# Patient Record
Sex: FEMALE | Race: WHITE | NOT HISPANIC OR LATINO | Employment: FULL TIME
[De-identification: names, ages, dates, MRNs, and addresses within clinical notes are randomized per-mention and may not be internally consistent; named-entity substitution may affect disease eponyms.]

---

## 2017-05-27 ENCOUNTER — HEALTH MAINTENANCE LETTER (OUTPATIENT)
Age: 52
End: 2017-05-27

## 2018-05-02 ENCOUNTER — TRANSFERRED RECORDS (OUTPATIENT)
Dept: MULTI SPECIALTY CLINIC | Facility: CLINIC | Age: 53
End: 2018-05-02

## 2018-05-02 LAB — HEP C HIM: NORMAL

## 2021-05-16 ENCOUNTER — OFFICE VISIT (OUTPATIENT)
Dept: URGENT CARE | Facility: URGENT CARE | Age: 56
End: 2021-05-16
Payer: COMMERCIAL

## 2021-05-16 VITALS
SYSTOLIC BLOOD PRESSURE: 110 MMHG | HEART RATE: 78 BPM | OXYGEN SATURATION: 97 % | TEMPERATURE: 98 F | RESPIRATION RATE: 16 BRPM | DIASTOLIC BLOOD PRESSURE: 70 MMHG

## 2021-05-16 DIAGNOSIS — J22 LOWER RESPIRATORY INFECTION: Primary | ICD-10-CM

## 2021-05-16 PROCEDURE — 99203 OFFICE O/P NEW LOW 30 MIN: CPT | Performed by: EMERGENCY MEDICINE

## 2021-05-16 RX ORDER — DOXYCYCLINE HYCLATE 100 MG
100 TABLET ORAL 2 TIMES DAILY
Qty: 20 TABLET | Refills: 0 | Status: SHIPPED | OUTPATIENT
Start: 2021-05-16 | End: 2021-05-26

## 2021-05-16 RX ORDER — PAROXETINE 10 MG/1
10 TABLET, FILM COATED ORAL DAILY
COMMUNITY
Start: 2021-02-22 | End: 2023-07-31

## 2021-05-16 RX ORDER — ALBUTEROL SULFATE 90 UG/1
2 AEROSOL, METERED RESPIRATORY (INHALATION) EVERY 4 HOURS PRN
Qty: 8 G | Refills: 0 | Status: SHIPPED | OUTPATIENT
Start: 2021-05-16

## 2021-05-16 RX ORDER — ATORVASTATIN CALCIUM 40 MG/1
40 TABLET, FILM COATED ORAL
COMMUNITY
Start: 2020-08-13 | End: 2024-02-06

## 2021-05-16 NOTE — PROGRESS NOTES
Assessment: Approximate 1 week history of respiratory infection now with increasing discolored phlegm and subjective wheezing.    Plan: Doxycycline, albuterol, with close monitoring and follow-up.    HPI: Patient is a 55-year-old female who is been ill for about 5 to 6 days with cough, productive of discolored phlegm now turning from yellow to green.  She is subjectively wheezing.  She is a smoker.  No history of asthma.  Denies Covid exposure.      ROS: See HPI otherwise normal.    Allergies   Allergen Reactions     Codeine Other (See Comments)     No Clinical Screening - See Comments Other (See Comments)     Animal fur      Current Outpatient Medications   Medication Sig Dispense Refill     albuterol (PROAIR HFA/PROVENTIL HFA/VENTOLIN HFA) 108 (90 Base) MCG/ACT inhaler Inhale 2 puffs into the lungs every 4 hours as needed for shortness of breath / dyspnea or wheezing 8 g 0     atorvastatin (LIPITOR) 40 MG tablet Take 40 mg by mouth       doxycycline hyclate (VIBRA-TABS) 100 MG tablet Take 1 tablet (100 mg) by mouth 2 times daily for 10 days 20 tablet 0     PARoxetine (PAXIL) 10 MG tablet Take 10 mg by mouth daily           PE: No acute distress.  Vital signs are normal with a pulse ox of 97%.  HEENT reveals moist oral mucous membranes.  Posterior pharynx slightly injected.  Ears reveal normal TMs.  Neck is supple without adenopathy.  Lungs reveal scattered expiratory wheezes with good air excursion.  No rales heard.  Heart is regular.  Skin warm dry.        TREATMENT: None.      ASSESSMENT: Lower respiratory tract with reactive airway component.      DIAGNOSIS: Lower respiratory tract infection.      PLAN: Doxycycline, albuterol as instructed no smoking.  Recheck if worse.  Recheck 1 week if still ill.

## 2021-05-16 NOTE — PATIENT INSTRUCTIONS
Antibiotics as instructed.    Use inhaler every 4 hours as needed with a minimum of 4 times daily for 1 week.    No smoking.    Recheck if more ill.    Recheck 1 week if no improvement.  Patient Education     Preventing Common Respiratory Infections  Respiratory infections such as colds and the flu (influenza) are common in winter. These infections are often caused by viruses. They may share some symptoms. But not all respiratory infections are the same. Some make you more sick than others. You can take steps to prevent common respiratory infections. And if you get sick, you can take care of yourself to keep the infection from getting worse.    What is a cold?  Symptoms include runny nose, coughing and sneezing, and sore throat. Cold symptoms tend to be milder than flu symptoms.  Symptoms tend to come on slowly. They last for a few days to about a week.  With a cold, you can still do most of the things you normally do.    What is the flu?  Symptoms include fever, headache, extreme tiredness (fatigue), cough, sore throat, runny nose, and muscle aches. Children may have upset stomach and vomiting, but adults often don t.  Symptoms tend to come on quickly. Some, such as fatigue and cough, can last a few weeks.  With the flu, you may feel worn out and not able to do normal activities.  It s most likely not the flu if an adult has vomiting or diarrhea for a day or two. This so-called  stomach flu  is probably a GI (gastrointestinal) infection.    When the infection gets worse  Without proper care, a respiratory infection can get worse. It can lead to serious complications and death. If you aren t getting better, call your healthcare provider. Complications can include:  Bronchitis (infection of the airways that leads to shortness of breath and coughing up thick yellow or green mucus)  Pneumonia (infection of the lungs in which fluid and mucus settle in the lungs, making breathing difficult)  Worsening of chronic  conditions such as heart failure, chronic lung disease, asthma, or diabetes  Severe dehydration (loss of fluids)  Sinus problems  Ear infections  Get a flu vaccine  A flu vaccine protects you from influenza (but not other colds or infections). Get a vaccine each fall, before flu season starts. This can be done at a clinic, healthcare provider s office, pharmacy, [facility] center, or through your workplace.  Get pneumococcal vaccines  Pneumonia can be a complication of influenza. There are 2 pneumococcal pneumonia vaccines that protect against many types of pneumonia. Talk with your healthcare provider about these important vaccines.   Keep germs from spreading  No one likes getting sick. To protect yourself and others from cold and flu germs:  Wash your hands often with warm water and soap. Scrub them for 15 to 20 seconds. Use alcohol-based hand  when you don t have access to soap and water.  Don t touch your eyes, nose, and mouth. This may help you keep germs out of your body.  Try to stay away from people with respiratory infections. You may want to stay out of crowds during flu season (winter).  Ask your healthcare provider if you should get a pneumonia vaccination.  Don't smoke and don't let others smoke in your home or car  How to wash your hands  Use warm water and plenty of soap. Work up a good lather.  Clean your whole hand, under your nails, between your fingers, and up your wrists. Wash for at least 15 to 20 seconds. Don t just wipe--rub well.  Rinse. Let the water run down your fingertips, not up your wrists.  In a public restroom, use a paper towel to turn off the faucet and open the door.    [employer] last reviewed this educational content on 11/1/2019 2000-2021 [employer]. All rights reserved. This information is not intended as a substitute for professional medical care. Always follow your healthcare professional's instructions.

## 2021-09-24 ENCOUNTER — TRANSFERRED RECORDS (OUTPATIENT)
Dept: MULTI SPECIALTY CLINIC | Facility: CLINIC | Age: 56
End: 2021-09-24

## 2021-09-24 LAB
CHOLESTEROL (EXTERNAL): 132 MG/DL
HDLC SERPL-MCNC: 53 MG/DL
LDL CHOLESTEROL CALCULATED (EXTERNAL): 66 MG/DL (ref 0–159)
TRIGLYCERIDES (EXTERNAL): 67 MG/DL (ref 30–150)

## 2021-12-08 ENCOUNTER — TRANSFERRED RECORDS (OUTPATIENT)
Dept: MULTI SPECIALTY CLINIC | Facility: CLINIC | Age: 56
End: 2021-12-08

## 2023-07-28 ENCOUNTER — OFFICE VISIT (OUTPATIENT)
Dept: FAMILY MEDICINE | Facility: CLINIC | Age: 58
End: 2023-07-28
Payer: COMMERCIAL

## 2023-07-28 ENCOUNTER — LAB (OUTPATIENT)
Dept: LAB | Facility: CLINIC | Age: 58
End: 2023-07-28
Payer: COMMERCIAL

## 2023-07-28 VITALS
OXYGEN SATURATION: 97 % | DIASTOLIC BLOOD PRESSURE: 64 MMHG | RESPIRATION RATE: 16 BRPM | WEIGHT: 147 LBS | HEIGHT: 68 IN | HEART RATE: 74 BPM | BODY MASS INDEX: 22.28 KG/M2 | SYSTOLIC BLOOD PRESSURE: 114 MMHG

## 2023-07-28 DIAGNOSIS — Z00.00 ROUTINE GENERAL MEDICAL EXAMINATION AT A HEALTH CARE FACILITY: ICD-10-CM

## 2023-07-28 DIAGNOSIS — R25.1 TREMOR: ICD-10-CM

## 2023-07-28 DIAGNOSIS — F41.0 PANIC ATTACK: ICD-10-CM

## 2023-07-28 DIAGNOSIS — E78.2 MIXED HYPERLIPIDEMIA: ICD-10-CM

## 2023-07-28 DIAGNOSIS — I31.39 EFFUSION, PERICARDIUM: ICD-10-CM

## 2023-07-28 DIAGNOSIS — Z86.39 HX OF THYROID NODULE: ICD-10-CM

## 2023-07-28 DIAGNOSIS — Z00.00 ROUTINE GENERAL MEDICAL EXAMINATION AT A HEALTH CARE FACILITY: Primary | ICD-10-CM

## 2023-07-28 DIAGNOSIS — Z12.31 VISIT FOR SCREENING MAMMOGRAM: ICD-10-CM

## 2023-07-28 DIAGNOSIS — F32.A DEPRESSION, UNSPECIFIED DEPRESSION TYPE: ICD-10-CM

## 2023-07-28 DIAGNOSIS — F17.200 TOBACCO USE DISORDER: ICD-10-CM

## 2023-07-28 LAB
ALBUMIN SERPL BCG-MCNC: 4.4 G/DL (ref 3.5–5.2)
ALP SERPL-CCNC: 78 U/L (ref 35–104)
ALT SERPL W P-5'-P-CCNC: 21 U/L (ref 0–50)
ANION GAP SERPL CALCULATED.3IONS-SCNC: 9 MMOL/L (ref 7–15)
AST SERPL W P-5'-P-CCNC: 26 U/L (ref 0–45)
BASOPHILS # BLD AUTO: 0.1 10E3/UL (ref 0–0.2)
BASOPHILS NFR BLD AUTO: 1 %
BILIRUB SERPL-MCNC: 0.8 MG/DL
BUN SERPL-MCNC: 16.3 MG/DL (ref 6–20)
CALCIUM SERPL-MCNC: 9.9 MG/DL (ref 8.6–10)
CHLORIDE SERPL-SCNC: 105 MMOL/L (ref 98–107)
CHOLEST SERPL-MCNC: 171 MG/DL
CREAT SERPL-MCNC: 0.66 MG/DL (ref 0.51–0.95)
CRP SERPL-MCNC: <3 MG/L
DEPRECATED HCO3 PLAS-SCNC: 27 MMOL/L (ref 22–29)
EOSINOPHIL # BLD AUTO: 0.4 10E3/UL (ref 0–0.7)
EOSINOPHIL NFR BLD AUTO: 4 %
ERYTHROCYTE [DISTWIDTH] IN BLOOD BY AUTOMATED COUNT: 12.3 % (ref 10–15)
ERYTHROCYTE [SEDIMENTATION RATE] IN BLOOD BY WESTERGREN METHOD: 8 MM/HR (ref 0–30)
GFR SERPL CREATININE-BSD FRML MDRD: >90 ML/MIN/1.73M2
GLUCOSE SERPL-MCNC: 94 MG/DL (ref 70–99)
HCT VFR BLD AUTO: 40.9 % (ref 35–47)
HDLC SERPL-MCNC: 56 MG/DL
HGB BLD-MCNC: 13.8 G/DL (ref 11.7–15.7)
IMM GRANULOCYTES # BLD: 0 10E3/UL
IMM GRANULOCYTES NFR BLD: 0 %
LDLC SERPL CALC-MCNC: 95 MG/DL
LYMPHOCYTES # BLD AUTO: 3 10E3/UL (ref 0.8–5.3)
LYMPHOCYTES NFR BLD AUTO: 36 %
MCH RBC QN AUTO: 30.7 PG (ref 26.5–33)
MCHC RBC AUTO-ENTMCNC: 33.7 G/DL (ref 31.5–36.5)
MCV RBC AUTO: 91 FL (ref 78–100)
MONOCYTES # BLD AUTO: 0.7 10E3/UL (ref 0–1.3)
MONOCYTES NFR BLD AUTO: 8 %
NEUTROPHILS # BLD AUTO: 4.2 10E3/UL (ref 1.6–8.3)
NEUTROPHILS NFR BLD AUTO: 51 %
NONHDLC SERPL-MCNC: 115 MG/DL
PLATELET # BLD AUTO: 308 10E3/UL (ref 150–450)
POTASSIUM SERPL-SCNC: 4.5 MMOL/L (ref 3.4–5.3)
PROT SERPL-MCNC: 7 G/DL (ref 6.4–8.3)
RBC # BLD AUTO: 4.5 10E6/UL (ref 3.8–5.2)
SODIUM SERPL-SCNC: 141 MMOL/L (ref 136–145)
T4 FREE SERPL-MCNC: 0.87 NG/DL (ref 0.9–1.7)
TRIGL SERPL-MCNC: 102 MG/DL
TSH SERPL DL<=0.005 MIU/L-ACNC: 0.17 UIU/ML (ref 0.3–4.2)
WBC # BLD AUTO: 8.3 10E3/UL (ref 4–11)

## 2023-07-28 PROCEDURE — 80053 COMPREHEN METABOLIC PANEL: CPT

## 2023-07-28 PROCEDURE — 84443 ASSAY THYROID STIM HORMONE: CPT

## 2023-07-28 PROCEDURE — 80061 LIPID PANEL: CPT

## 2023-07-28 PROCEDURE — 36415 COLL VENOUS BLD VENIPUNCTURE: CPT

## 2023-07-28 PROCEDURE — 85025 COMPLETE CBC W/AUTO DIFF WBC: CPT

## 2023-07-28 PROCEDURE — 84439 ASSAY OF FREE THYROXINE: CPT

## 2023-07-28 PROCEDURE — 99214 OFFICE O/P EST MOD 30 MIN: CPT | Mod: 25 | Performed by: FAMILY MEDICINE

## 2023-07-28 PROCEDURE — 99396 PREV VISIT EST AGE 40-64: CPT | Performed by: FAMILY MEDICINE

## 2023-07-28 PROCEDURE — 85652 RBC SED RATE AUTOMATED: CPT

## 2023-07-28 PROCEDURE — 86140 C-REACTIVE PROTEIN: CPT

## 2023-07-28 RX ORDER — ALPRAZOLAM 0.25 MG
TABLET ORAL
COMMUNITY
Start: 2022-07-18 | End: 2023-07-31

## 2023-07-28 RX ORDER — IBUPROFEN 200 MG
TABLET ORAL
COMMUNITY

## 2023-07-28 RX ORDER — CALCIUM CARBONATE 500(1250)
TABLET,CHEWABLE ORAL
COMMUNITY

## 2023-07-28 RX ORDER — CETIRIZINE HYDROCHLORIDE 10 MG/1
TABLET ORAL DAILY PRN
COMMUNITY

## 2023-07-28 RX ORDER — TRETINOIN 0.25 MG/G
CREAM TOPICAL
COMMUNITY
Start: 2023-07-22

## 2023-07-28 RX ORDER — ATORVASTATIN CALCIUM 20 MG/1
TABLET, FILM COATED ORAL
COMMUNITY
Start: 2023-03-31 | End: 2024-05-02

## 2023-07-28 ASSESSMENT — ENCOUNTER SYMPTOMS
ABDOMINAL PAIN: 0
PALPITATIONS: 0
SORE THROAT: 0
HEADACHES: 1
COUGH: 0
HEMATOCHEZIA: 0
PARESTHESIAS: 0
WEAKNESS: 0
NERVOUS/ANXIOUS: 1
EYE PAIN: 0
FREQUENCY: 0
SHORTNESS OF BREATH: 0
DYSURIA: 0
HEMATURIA: 0
ARTHRALGIAS: 0
CHILLS: 0
MYALGIAS: 0
JOINT SWELLING: 0
CONSTIPATION: 0
NAUSEA: 0
HEARTBURN: 1
BREAST MASS: 0
FEVER: 0
DIZZINESS: 1
DIARRHEA: 0

## 2023-07-28 ASSESSMENT — PAIN SCALES - GENERAL: PAINLEVEL: NO PAIN (0)

## 2023-07-28 NOTE — PATIENT INSTRUCTIONS
Patient is to contact [facility]  at [phone], to schedule this appointment.    Preventive Health Recommendations  Female Ages 50 - 64    Yearly exam: See your health care provider every year in order to  Review health changes.   Discuss preventive care.    Review your medicines if your doctor has prescribed any.    Get a Pap test every three years (unless you have an abnormal result and your provider advises testing more often).  If you get Pap tests with HPV test, you only need to test every 5 years, unless you have an abnormal result.   You do not need a Pap test if your uterus was removed (hysterectomy) and you have not had cancer.  You should be tested each year for STDs (sexually transmitted diseases) if you're at risk.   Have a mammogram every 1 to 2 years.  Have a colonoscopy at age 50, or have a yearly FIT test (stool test). These exams screen for colon cancer.    Have a cholesterol test every 5 years, or more often if advised.  Have a diabetes test (fasting glucose) every three years. If you are at risk for diabetes, you should have this test more often.   If you are at risk for osteoporosis (brittle bone disease), think about having a bone density scan (DEXA).    Shots: Get a flu shot each year. Get a tetanus shot every 10 years.    Nutrition:   Eat at least 5 servings of fruits and vegetables each day.  Eat whole-grain bread, whole-wheat pasta and brown rice instead of white grains and rice.  Get adequate Calcium and Vitamin D.     Lifestyle  Exercise at least 150 minutes a week (30 minutes a day, 5 days a week). This will help you control your weight and prevent disease.  Limit alcohol to one drink per day.  No smoking.   Wear sunscreen to prevent skin cancer.   See your dentist every six months for an exam and cleaning.  See your eye doctor every 1 to 2 years.

## 2023-07-28 NOTE — PROGRESS NOTES
SUBJECTIVE:   CC: Olena is an 57 year old who presents for preventive health visit.       7/28/2023     1:19 PM   Additional Questions   Roomed by Lraa BUITRAGO   Accompanied by Self       Healthy Habits:     Getting at least 3 servings of Calcium per day:  Yes    Bi-annual eye exam:  Yes    Dental care twice a year:  NO    Sleep apnea or symptoms of sleep apnea:  None    Diet:  Regular (no restrictions)    Frequency of exercise:  2-3 days/week    Duration of exercise:  15-30 minutes    Taking medications regularly:  Yes    Medication side effects:  None    Additional concerns today:  Yes    -Reports a few years ago with seen by cardiologist that she told she had fluid around her heart.  Underwent testing at that time and was asymptomatic.  Will occasionally become fatigued with exercise, no chest pain palpitations or presyncopal symptoms.  Wondering if this could be from fluid again    -History of Parkinson's disease in mom.  Will notice tremors, more so left than right.  Noticed head shaking intermittently.  Mainly noted when she is trying to write or  cups.  No other neurological symptoms noted    Today's PHQ-2 Score:       7/28/2023     1:11 PM   PHQ-2 ( 1999 [employer])   Q1: Little interest or pleasure in doing things 0   Q2: Feeling down, depressed or hopeless 0   PHQ-2 Score 0   Q1: Little interest or pleasure in doing things Not at all   Q2: Feeling down, depressed or hopeless Not at all   PHQ-2 Score 0     Have you ever done Advance Care Planning? (For example, a Health Directive, POLST, or a discussion with a medical provider or your loved ones about your wishes): No, advance care planning information given to patient to review.  Patient plans to discuss their wishes with loved ones or provider.      Social History     Tobacco Use    Smoking status: Every Day    Smokeless tobacco: Never   Substance Use Topics    Alcohol use: Not on file             7/28/2023     1:11 PM   Alcohol Use   Prescreen: >3  drinks/day or >7 drinks/week? No     Reviewed orders with patient.  Reviewed health maintenance and updated orders accordingly - Yes  Lab work is in process    Breast Cancer Screening:    FHS-7:       7/28/2023     1:12 PM   Breast CA Risk Assessment (FHS-7)   Did any of your first-degree relatives have breast or ovarian cancer? Yes   Did any of your relatives have bilateral breast cancer? Yes   Did any man in your family have breast cancer? No   Did any woman in your family have breast and ovarian cancer? No   Did any woman in your family have breast cancer before age 50 y? Yes   Do you have 2 or more relatives with breast and/or ovarian cancer? Unknown   Do you have 2 or more relatives with breast and/or bowel cancer? Unknown     click delete button to remove this line now  Mammogram Screening: Recommended mammography every 1-2 years with patient discussion and risk factor consideration  Pertinent mammograms are reviewed under the imaging tab.    History of abnormal Pap smear: Status post benign hysterectomy. Health Maintenance and Surgical History updated.     Reviewed and updated as needed this visit by clinical staff   Tobacco  Allergies  Meds              Reviewed and updated as needed this visit by Provider                   Past Medical History:   Diagnosis Date    Anxiety     History of colonic polyps     Mixed hyperlipidemia     Thyroid nodule       Past Surgical History:   Procedure Laterality Date    HYSTERECTOMY TOTAL ABDOMINAL  01/01/2007       Review of Systems   Constitutional:  Negative for chills and fever.   HENT:  Negative for congestion, ear pain, hearing loss and sore throat.    Eyes:  Negative for pain and visual disturbance.   Respiratory:  Negative for cough and shortness of breath.    Cardiovascular:  Negative for chest pain, palpitations and peripheral edema.   Gastrointestinal:  Positive for heartburn. Negative for abdominal pain, constipation, diarrhea, hematochezia and nausea.  "  Breasts:  Negative for tenderness, breast mass and discharge.   Genitourinary:  Positive for vaginal discharge. Negative for dysuria, frequency, genital sores, hematuria, pelvic pain, urgency and vaginal bleeding.   Musculoskeletal:  Negative for arthralgias, joint swelling and myalgias.   Skin:  Negative for rash.   Neurological:  Positive for dizziness and headaches. Negative for weakness and paresthesias.   Psychiatric/Behavioral:  Negative for mood changes. The patient is nervous/anxious.         OBJECTIVE:   /64 (BP Location: Right arm, Patient Position: Sitting, Cuff Size: Adult Regular)   Pulse 74   Resp 16   Ht 1.715 m (5' 7.5\")   Wt 66.7 kg (147 lb)   LMP 01/01/2009   SpO2 97%   BMI 22.68 kg/m    Physical Exam  Constitutional:       Appearance: Normal appearance.   HENT:      Head: Normocephalic.      Right Ear: Tympanic membrane normal.      Left Ear: Tympanic membrane normal.   Eyes:      Conjunctiva/sclera: Conjunctivae normal.   Cardiovascular:      Rate and Rhythm: Normal rate and regular rhythm.      Pulses: Normal pulses.      Heart sounds: Normal heart sounds.   Pulmonary:      Effort: Pulmonary effort is normal.      Breath sounds: Normal breath sounds.   Abdominal:      General: Bowel sounds are normal.   Skin:     General: Skin is warm and dry.   Neurological:      Mental Status: She is alert.   Psychiatric:         Thought Content: Thought content normal.         Judgment: Judgment normal.       ASSESSMENT/PLAN:   Olena was seen today for establish care, physical and shaking.    Diagnoses and all orders for this visit:    Routine general medical examination at a health care facility  -     CBC with platelets and differential; Future  -     Comprehensive metabolic panel (BMP + Alb, Alk Phos, ALT, AST, Total. Bili, TP); Future    Visit for screening mammogram  -     MA SCREENING DIGITAL BILAT - Future  (s+30); Future    Tobacco use disorder  Encourage cessation    Tremor  Seems " consistent with intention tremor, however with family history of Parkinson's can consider meeting with neurology  -     TSH with free T4 reflex; Future  -     CBC with platelets and differential; Future  -     Adult Neurology  Referral; Future    Mixed hyperlipidemia  -     Lipid panel reflex to direct LDL Non-fasting; Future  -     Lipid panel reflex to direct LDL Non-fasting; Future    Hx of thyroid nodule  Underwent FNA in the past.  This was normal however due to side recommended repeating ultrasound in 3 years  -     TSH with free T4 reflex; Future  -     US Thyroid; Future    Effusion, pericardium  Can consider repeating echocardiogram.  I Do not think her symptoms are anginal.  Discussed symptoms to monitor for that would provide her with more prompt evaluation  -     ESR: Erythrocyte sedimentation rate; Future  -     CRP, inflammation; Future  -     Echocardiogram Complete; Future    Other orders  -     REVIEW OF HEALTH MAINTENANCE PROTOCOL ORDERS    Depression, unspecified depression type  Stable,r efill provided  - PARoxetine (PAXIL) 10 MG tablet; Take 1 tablet (10 mg) by mouth daily  Dispense: 90 tablet; Refill: 3     Panic attack  Encouraged sparingly using  - ALPRAZolam (XANAX) 0.25 MG tablet; TAKE ONE TABLET BY MOUTH ONCE DAILY AS NEEDED FOR ANXIETY (PANIC ATTACKS)  Dispense: 30 tablet; Refill: 0      Patient has been advised of split billing requirements and indicates understanding: Yes      COUNSELING:  Reviewed preventive health counseling, as reflected in patient instructions  Special attention given to:        Regular exercise       Healthy diet/nutrition       Vision screening       Folic Acid       Osteoporosis prevention/bone health        She reports that she has been smoking. She has never used smokeless tobacco.  Nicotine/Tobacco Cessation Plan:   Information offered: Patient not interested at this time      AMBER PICKERING DO  [facility]

## 2023-07-29 ENCOUNTER — TELEPHONE (OUTPATIENT)
Dept: FAMILY MEDICINE | Facility: CLINIC | Age: 58
End: 2023-07-29
Payer: COMMERCIAL

## 2023-07-29 NOTE — TELEPHONE ENCOUNTER
Reason for Call:  Medication or medication refill:    Do you use a [facility]?  Name of the pharmacy and phone number for the current request:  [facility] in [place] on [address] [phone]    Name of the medication requested:  All patients meds prescribed    Other request: Patient was seen 07/28/2023 and her medications werent sent to pharmacy in [place]    Can we leave a detailed message on this number? YES    Phone number patient can be reached at: Cell number on file:    Telephone Information:   Mobile [phone]       Best Time: anytime    Call taken on 7/29/2023 at 10:10 AM by MARÍA REYES

## 2023-07-31 RX ORDER — PAROXETINE 10 MG/1
10 TABLET, FILM COATED ORAL DAILY
Qty: 30 TABLET | Status: CANCELLED | OUTPATIENT
Start: 2023-07-31

## 2023-07-31 RX ORDER — PAROXETINE 10 MG/1
10 TABLET, FILM COATED ORAL DAILY
Qty: 90 TABLET | Refills: 3 | Status: SHIPPED | OUTPATIENT
Start: 2023-07-31 | End: 2024-02-06

## 2023-07-31 RX ORDER — ALPRAZOLAM 0.25 MG
0.25 TABLET ORAL PRN
Status: CANCELLED | OUTPATIENT
Start: 2023-07-31

## 2023-07-31 RX ORDER — ATORVASTATIN CALCIUM 20 MG/1
20 TABLET, FILM COATED ORAL DAILY
Qty: 90 TABLET | Refills: 3 | Status: SHIPPED | OUTPATIENT
Start: 2023-07-31 | End: 2024-02-06

## 2023-07-31 RX ORDER — ATORVASTATIN CALCIUM 20 MG/1
20 TABLET, FILM COATED ORAL DAILY
Qty: 30 TABLET | Status: CANCELLED | OUTPATIENT
Start: 2023-07-31

## 2023-07-31 RX ORDER — ALPRAZOLAM 0.25 MG
TABLET ORAL
Qty: 30 TABLET | Refills: 0 | Status: SHIPPED | OUTPATIENT
Start: 2023-07-31 | End: 2024-03-13

## 2023-07-31 NOTE — TELEPHONE ENCOUNTER
Called patient to obtain current medications that she needs Dr. Rapp to review to order.     Cued up medications but unsure how many refills should be ordered. Patient had yearly visit 7/28/23 but no medications were ordered. All medications were reported as historic.    Patient reports she is prescribed:   Xanax 0.25 mg take 1 tablet as needed for anxiety or panic attacks. Dispense 20 tablets.    Requested Prescriptions   Pending Prescriptions Disp Refills    PARoxetine (PAXIL) 10 MG tablet 30 tablet      Sig: Take 1 tablet (10 mg) by mouth daily       There is no refill protocol information for this order       atorvastatin (LIPITOR) 20 MG tablet 30 tablet      Sig: Take 1 tablet (20 mg) by mouth daily       There is no refill protocol information for this order       ALPRAZolam (XANAX) 0.25 MG tablet       Sig: Take 1 tablet (0.25 mg) by mouth as needed for anxiety       There is no refill protocol information for this order        Routing refill request to provider for review/approval because:  Medication is reported/historical    Katia Lerner RN on 7/31/2023 at 10:46 AM

## 2023-07-31 NOTE — TELEPHONE ENCOUNTER
Called and left message notifying patient the meds were sent for refill.     Katia Lerner RN on 7/31/2023 at 3:35 PM

## 2023-07-31 NOTE — TELEPHONE ENCOUNTER
I have sent refills over.  I apologize for the delay, please let patient know.    AMBER PICKERING, DO

## 2023-08-09 DIAGNOSIS — Z86.39 HX OF THYROID NODULE: Primary | ICD-10-CM

## 2023-08-10 ENCOUNTER — TELEPHONE (OUTPATIENT)
Dept: FAMILY MEDICINE | Facility: CLINIC | Age: 58
End: 2023-08-10
Payer: COMMERCIAL

## 2023-08-10 NOTE — TELEPHONE ENCOUNTER
Dr. Rapp:    Patient received her results today and has additional question for you regarding her echo. Patient states she still very mild ache in her chest, she is not short of breath, not radiating anywhere, no other symptoms. She scheduled the Echo but it is in October, is this ok to wait till then? Patient states you are aware of the pain and she verbalizes understanding of when to seek ER care.    Please advise.      NICOLE Baird

## 2023-08-10 NOTE — TELEPHONE ENCOUNTER
Ok to wait until then as long as symptoms are stable. IF she is willing to drive she can also look at other locations that may have sooner openings.   AMBER PICKERING, DO

## 2023-09-25 ENCOUNTER — LAB (OUTPATIENT)
Dept: LAB | Facility: CLINIC | Age: 58
End: 2023-09-25
Payer: COMMERCIAL

## 2023-09-25 DIAGNOSIS — Z86.39 HX OF THYROID NODULE: ICD-10-CM

## 2023-09-25 DIAGNOSIS — Z11.4 SCREENING FOR HIV (HUMAN IMMUNODEFICIENCY VIRUS): Primary | ICD-10-CM

## 2023-09-25 LAB
HIV 1+2 AB+HIV1 P24 AG SERPL QL IA: NONREACTIVE
T3FREE SERPL-MCNC: 3.1 PG/ML (ref 2–4.4)
T4 FREE SERPL-MCNC: 0.84 NG/DL (ref 0.9–1.7)
T4 SERPL-MCNC: 5.6 UG/DL (ref 4.5–11.7)
TSH SERPL DL<=0.005 MIU/L-ACNC: 0.18 UIU/ML (ref 0.3–4.2)

## 2023-09-25 PROCEDURE — 87389 HIV-1 AG W/HIV-1&-2 AB AG IA: CPT

## 2023-09-25 PROCEDURE — 84443 ASSAY THYROID STIM HORMONE: CPT

## 2023-09-25 PROCEDURE — 36415 COLL VENOUS BLD VENIPUNCTURE: CPT

## 2023-09-25 PROCEDURE — 84439 ASSAY OF FREE THYROXINE: CPT

## 2023-09-25 PROCEDURE — 84481 FREE ASSAY (FT-3): CPT

## 2023-10-05 ENCOUNTER — HOSPITAL ENCOUNTER (OUTPATIENT)
Dept: CARDIOLOGY | Facility: CLINIC | Age: 58
Discharge: HOME | End: 2023-10-05
Attending: FAMILY MEDICINE | Admitting: FAMILY MEDICINE
Payer: COMMERCIAL

## 2023-10-05 DIAGNOSIS — I31.39 EFFUSION, PERICARDIUM: ICD-10-CM

## 2023-10-05 LAB — LVEF ECHO: NORMAL

## 2023-10-05 PROCEDURE — 93306 TTE W/DOPPLER COMPLETE: CPT | Mod: 26 | Performed by: INTERNAL MEDICINE

## 2023-10-05 PROCEDURE — 93306 TTE W/DOPPLER COMPLETE: CPT

## 2023-10-28 ENCOUNTER — HEALTH MAINTENANCE LETTER (OUTPATIENT)
Age: 58
End: 2023-10-28

## 2023-11-10 ENCOUNTER — HOSPITAL ENCOUNTER (OUTPATIENT)
Dept: ULTRASOUND IMAGING | Facility: CLINIC | Age: 58
Discharge: HOME | End: 2023-11-10
Attending: FAMILY MEDICINE
Payer: COMMERCIAL

## 2023-11-10 ENCOUNTER — HOSPITAL ENCOUNTER (OUTPATIENT)
Dept: MAMMOGRAPHY | Facility: CLINIC | Age: 58
Discharge: HOME | End: 2023-11-10
Attending: FAMILY MEDICINE
Payer: COMMERCIAL

## 2023-11-10 DIAGNOSIS — Z12.31 VISIT FOR SCREENING MAMMOGRAM: ICD-10-CM

## 2023-11-10 DIAGNOSIS — Z86.39 HX OF THYROID NODULE: ICD-10-CM

## 2023-11-10 PROCEDURE — 77067 SCR MAMMO BI INCL CAD: CPT

## 2023-11-10 PROCEDURE — 76536 US EXAM OF HEAD AND NECK: CPT

## 2023-11-16 DIAGNOSIS — E05.90 SUBCLINICAL HYPERTHYROIDISM: ICD-10-CM

## 2023-11-16 DIAGNOSIS — E04.1 THYROID NODULE: Primary | ICD-10-CM

## 2023-11-27 ENCOUNTER — TELEPHONE (OUTPATIENT)
Dept: NEUROLOGY | Facility: CLINIC | Age: 58
End: 2023-11-27
Payer: COMMERCIAL

## 2023-12-06 ENCOUNTER — TELEPHONE (OUTPATIENT)
Dept: FAMILY MEDICINE | Facility: CLINIC | Age: 58
End: 2023-12-06
Payer: COMMERCIAL

## 2023-12-06 NOTE — TELEPHONE ENCOUNTER
MTM referral from: [facility] visit (referral by provider)    MTM referral outreach attempt #2 on December 6, 2023 at 2:40 PM      Outcome: Patient not reachable after several attempts, will route to [place] Pharmacist/Provider as an FYI.  [facility] scheduling number is .  Thank you for the referral.    Use [employer] fully insured (full mnm needed) for the carrier/Plan on the flowsheet      [employer] Message Sent    Lisa Headley CPhT  [place]

## 2024-02-01 ENCOUNTER — HOSPITAL ENCOUNTER (OUTPATIENT)
Dept: NUCLEAR MEDICINE | Facility: CLINIC | Age: 59
Setting detail: NUCLEAR MEDICINE
Discharge: HOME | End: 2024-02-01
Attending: FAMILY MEDICINE
Payer: COMMERCIAL

## 2024-02-01 DIAGNOSIS — E05.90 SUBCLINICAL HYPERTHYROIDISM: ICD-10-CM

## 2024-02-01 DIAGNOSIS — E04.1 THYROID NODULE: ICD-10-CM

## 2024-02-01 DIAGNOSIS — F32.A DEPRESSION, UNSPECIFIED DEPRESSION TYPE: ICD-10-CM

## 2024-02-01 PROCEDURE — A9516 IODINE I-123 SOD IODIDE MIC: HCPCS | Performed by: FAMILY MEDICINE

## 2024-02-01 PROCEDURE — 78014 THYROID IMAGING W/BLOOD FLOW: CPT

## 2024-02-01 PROCEDURE — 343N000001 HC RX 343: Performed by: FAMILY MEDICINE

## 2024-02-01 RX ADMIN — Medication 245 MICROCURIE: at 07:44

## 2024-02-02 ENCOUNTER — HOSPITAL ENCOUNTER (OUTPATIENT)
Dept: NUCLEAR MEDICINE | Facility: CLINIC | Age: 59
Setting detail: NUCLEAR MEDICINE
Discharge: HOME | End: 2024-02-02
Attending: FAMILY MEDICINE
Payer: COMMERCIAL

## 2024-02-02 RX ORDER — PAROXETINE 20 MG/1
20 TABLET, FILM COATED ORAL EVERY MORNING
Qty: 30 TABLET | Refills: 1 | Status: SHIPPED | OUTPATIENT
Start: 2024-02-02 | End: 2024-03-04

## 2024-02-02 NOTE — TELEPHONE ENCOUNTER
"Requested Prescriptions   Pending Prescriptions Disp Refills    PARoxetine (PAXIL) 20 MG tablet [Pharmacy Med Name: PAROXETINE 20MG TABLETS] 30 tablet 1     Sig: TAKE 1 TABLET(20 MG) BY MOUTH EVERY MORNING       SSRIs Protocol Failed - 2/1/2024  3:31 AM        Failed - PHQ-9 score less than 5 in past 6 months     Please review last PHQ-9 score.           Failed - Recent (6 mo) or future (30 days) visit within the authorizing provider's specialty     Patient had office visit in the last 6 months or has a visit in the next 30 days with authorizing provider or within the authorizing provider's specialty.  See \"Patient Info\" tab in inbasket, or \"Choose Columns\" in Meds & Orders section of the refill encounter.            Passed - Medication is active on med list        Passed - Patient is age 18 or older        Passed - No active pregnancy on record        Passed - No positive pregnancy test in last 12 months           Routing refill request to provider for review/approval because:  PHQ-9    Katia Lerner RN on 2/2/2024 at 9:04 AM        "

## 2024-02-02 NOTE — TELEPHONE ENCOUNTER
Olena MADRID [facility] Pool (supporting Allison Rapp, )31 minutes ago (8:34 AM)     ROCCO Rapp I wanted to update you on the Paxil increase to 20 mg. I feel much better it is definitely what I needed. Also thought I d let you know I have cut down on my milk intake drastically. I switched over to almond and oat milk as I found out milk is very high in iodine Which I drink probably six glasses a day. I have noticed a slight weight gain and a little decrease in my tremors which has been very helpful. I did have the thyroid uptake test today. Thanks so much. Please refill the Paxil prescription.          Patient sent this in a [employer] message in a separate message.     Please review.    Katia Lerner RN on 2/2/2024 at 9:07 AM

## 2024-02-05 ENCOUNTER — TELEPHONE (OUTPATIENT)
Dept: NEUROLOGY | Facility: CLINIC | Age: 59
End: 2024-02-05
Payer: COMMERCIAL

## 2024-02-06 ENCOUNTER — OFFICE VISIT (OUTPATIENT)
Dept: NEUROLOGY | Facility: CLINIC | Age: 59
End: 2024-02-06
Attending: FAMILY MEDICINE
Payer: COMMERCIAL

## 2024-02-06 VITALS
DIASTOLIC BLOOD PRESSURE: 76 MMHG | HEART RATE: 79 BPM | WEIGHT: 146.4 LBS | OXYGEN SATURATION: 96 % | SYSTOLIC BLOOD PRESSURE: 115 MMHG | BODY MASS INDEX: 22.59 KG/M2

## 2024-02-06 DIAGNOSIS — R25.1 TREMOR: ICD-10-CM

## 2024-02-06 DIAGNOSIS — G25.0 ESSENTIAL TREMOR: Primary | ICD-10-CM

## 2024-02-06 PROCEDURE — 99205 OFFICE O/P NEW HI 60 MIN: CPT | Performed by: PSYCHIATRY & NEUROLOGY

## 2024-02-06 RX ORDER — L. ACIDOPHILUS/L.BULGARICUS 1MM CELL
TABLET ORAL
COMMUNITY

## 2024-02-06 ASSESSMENT — UNIFIED PARKINSONS DISEASE RATING SCALE (UPDRS)
CONSTANCY_TREMOR_ATREST: (0) NORMAL: NO TREMOR.
LEG_AGILITY_LEFT: (0) NORMAL: NO PROBLEMS.
SPONTANEITY_OF_MOVEMENT: (0) NORMAL: NO PROBLEMS.
DYSKINESIAS_PRESENT: NO
POSTURAL_STABILITY: (0) NORMAL: NO PROBLEMS. RECOVERS WITH ONE OR TWO STEPS.
TOETAPPING_LEFT: (0) NORMAL: NO PROBLEMS.
HANDMOVEMENTS_RIGHT: (0) NORMAL: NO PROBLEMS.
SPEECH: (0) NORMAL: NO SPEECH PROBLEMS.
PARKINSONS_MEDS: OFF
TOETAPPING_RIGHT: (0) NORMAL: NO PROBLEMS.
TOTAL_SCORE: 2
TOTAL_SCORE_LEFT: 3
AMPLITUDE_LUE: (0) NORMAL: NO TREMOR.
RIGIDITY_RLE: (0) NORMAL: NO RIGIDITY.
AXIAL_SCORE: 0
AMPLITUDE_RUE: (0) NORMAL: NO TREMOR.
AMPLITUDE_LLE: (0) NORMAL: NO TREMOR.
RIGIDITY_RUE: (0) NORMAL: NO RIGIDITY.
RIGIDITY_NECK: (0) NORMAL: NO RIGIDITY.
AMPLITUDE_RLE: (0) NORMAL: NO TREMOR.
RIGIDITY_LLE: (0) NORMAL: NO RIGIDITY.
FACIAL_EXPRESSION: (0) NORMAL: NORMAL FACIAL EXPRESSION.
HANDMOVEMENTS_LEFT: (0) NORMAL: NO PROBLEMS.
RIGIDITY_LUE: (0) NORMAL: NO RIGIDITY.
GAIT: (0) NORMAL: NO PROBLEMS.
LEG_AGILITY_RIGHT: (0) NORMAL: NO PROBLEMS.
PRONATION_SUPINATION_LEFT: (0) NORMAL: NO PROBLEMS.
ARISING_CHAIR: (0) NORMAL: NO PROBLEMS. ABLE TO ARISE QUICKLY WITHOUT HESITATION.
FREEZING_GAIT: (0) NORMAL: NO FREEZING.
FINGER_TAPPING_LEFT: (0) NORMAL: NO PROBLEMS.
POSTURE: (0) NORMAL: NO PROBLEMS.
TOTAL_SCORE: 5
AMPLITUDE_LIP_JAW: (0) NORMAL: NO TREMOR.
MOVEMENTS_INTERFERE_WITH_RATINGS: NO
FINGER_TAPPING_RIGHT: (0) NORMAL: NO PROBLEMS.
PRONATION_SUPINATION_RIGHT: (0) NORMAL: NO PROBLEMS.

## 2024-02-06 NOTE — LETTER
2024         RE: Olena King  [address] [place] [address]        Dear Colleague,    Thank you for referring your patient, Olena King, to the [facility]. Please see a copy of my visit note below.    Department of Neurology  Movement Disorders Division   New Patient Visit    Patient: Olena King   MRN: [id number]   : 1965   Date of Visit: 2024  PCP: Physician No Ref-Primary   Referring provider: Dionte    CC:  Tremor evaluation    HPI:  Ms. King is a 58 year old right-handed female with history significant for thyroid disease who presents to movement disorder clinic for tremors.     She comes in for evaluation of tremors that have been part of her life for as far back as she can remember, she also says that several family members have some shakiness in her hands.  However things seem to have progressed over the past couple years which caught her attention and made her decide to seek medical assistance for.    She endorses tremors affecting bilateral hands they have been longstanding, mostly noticeable occasionally in the context of holding things and doing things with her hands.  No tremors present at rest.  No tremors affecting voice and head trunk or lower extremities throughout her life, except for some head tremors that have taken place for the past year or 2.    No rigidity or bradykinesia.  No posturing that she has noticed.  No issues with pain or muscle spasms.  She denies any change in posture or gait.  She says her balance is really good.  No falls, speech, or swallowing issues.    She denies any change in sense of smell or taste, she denies any constipation or bladder control issues, she denies any RBD-like symptoms.  No cognitive dysfunction or any concerns for any psychiatric comorbidities.    As far as her symptoms go, she endorses that for the past couple of years at times other folks will comment that perhaps her head is  shaking a little, even though she does not notice herself.  Further, she has noticed that doing certain things involving her hands such as putting mascara on, as well as writing can get a little challenging in the sense that it takes longer because her hand tends to shake and she has to stabilize it more.  At times she will catch herself trying to stabilize the hand she is using by trying to abduct the elbow or just stabilize with the contralateral hand to be able to complete the task within a reasonable amount of time.  It seems like it for the past year to year and a half she has noticed that her left hand is shaking more consistently still while doing things such as holding a glass of water and bring it to her mouth.  No resting component has been noticed lately.    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  She feels that the tremors are nondisabling at this point.  However, since her mother had a late in life diagnosis of Parkinson's disease, she was a little concerned which prompted request for an evaluation and opinion on the case because.    No prior occupational exposures, no prior intake of dopamine blocking medications including antipsychotics, antiemetics, or antivertigo medication.  She smokes regularly, she drinks only occasionally denies any prior history of heavy drinking, she denies any intake of any recreational drugs.    As far as her family history goes, she endorses multiple family members with similar tremors affecting her hands.  She denies knowing much about paternal or maternal family history is because they do not live near them.  She does have 2 siblings who have similar tremors and they have shocked her entire life.  She has 1 daughter who also has similar tremors to hers.    Of note, she is under investigation of her thyroid function, in the setting of having a sudden weight loss and apparently her most recent labs did show abnormal thyroid hormone suggestive of  thyroid dysfunction, she is still under workup for that.    Given all the above, she has been sent here to be evaluated pertaining her tremors.  ROS:  All others negative except as listed above. Pertinent movement disorders-specific ROS listed above.    Past Medical History:   Diagnosis Date     Anxiety      History of colonic polyps      Mixed hyperlipidemia      Thyroid nodule         Past Surgical History:   Procedure Laterality Date     HYSTERECTOMY TOTAL ABDOMINAL  01/01/2007          Current Outpatient Medications:      albuterol (PROAIR HFA/PROVENTIL HFA/VENTOLIN HFA) 108 (90 Base) MCG/ACT inhaler, Inhale 2 puffs into the lungs every 4 hours as needed for shortness of breath / dyspnea or wheezing, Disp: 8 g, Rfl: 0     Alum Hydroxide-Mag Trisilicate 80-20 MG CHEW, Take 2 tablets by mouth, Disp: , Rfl:      atorvastatin (LIPITOR) 20 MG tablet, , Disp: , Rfl:      calcium carbonate 500 mg, elemental, 1250 (500 Ca) MG tablet chewable, Chew and Swallow 200-400 mg by mouth every 4 hours as needed for heartburn., Disp: , Rfl:      cetirizine (ZYRTEC) 10 MG tablet, Take by mouth daily as needed, Disp: , Rfl:      ibuprofen (ADVIL/MOTRIN) 200 MG tablet, Take by mouth every 4-6 hours as needed for anti-inflammatory response or mild pain., Disp: , Rfl:      Lactobacillus 0.05-0.05 MG TABS, Take by mouth once daily. One Billion per day, Disp: , Rfl:      MELALEUCA SC, , Disp: , Rfl:      PARoxetine (PAXIL) 20 MG tablet, TAKE 1 TABLET(20 MG) BY MOUTH EVERY MORNING, Disp: 30 tablet, Rfl: 1     tretinoin (RETIN-A) 0.025 % external cream, , Disp: , Rfl:      ALPRAZolam (XANAX) 0.25 MG tablet, TAKE ONE TABLET BY MOUTH ONCE DAILY AS NEEDED FOR ANXIETY (PANIC ATTACKS) (Patient not taking: Reported on 2/6/2024), Disp: 30 tablet, Rfl: 0     nicotine (NICOTROL) 10 MG inhaler, Use 1 cartridge as needed for urge to smoke by puffing over course of 20min.  Use 6-16 cart/day; reduce number of cart/day over 6-12 weeks. (Patient not  taking: Reported on 2/6/2024), Disp: 6 each, Rfl: 3     Allergies   Allergen Reactions     Codeine Other (See Comments)     No Clinical Screening - See Comments Other (See Comments)     Animal fur        Family History   Problem Relation Age of Onset     Hypertension Mother      Diabetes Mother      Heart Failure Mother      Parkinsonism Mother      Coronary Artery Disease Father      Cardiomyopathy Brother         amyloidosis     Congenital heart disease Brother         Social History:  She  reports that she has been smoking cigarettes. She has a 40 pack-year smoking history. She has never used smokeless tobacco.     PHYSICAL EXAM:  /76   Pulse 79   Wt 66.4 kg (146 lb 6.4 oz)   LMP 01/01/2009   SpO2 96%   BMI 22.59 kg/m       Gen: She is in no acute distress, respirations appear nonlabored on room air.    NEURO:  MS:  Alert, oriented, appropriate    CN:  PERRLA, EOMI, face symmetric, normal strength and sensation, tongue and palate are midline, SCM 5/5 throughout    Motor:    Normal tone, no fasciculations, strength is 5/5 throughout    Sensation:  Preserved to all modalities in all extremities    Coordination:  Normal finger-nose    Reflexes: 2+ throughout, downgoing toes bilaterally     Gait:  Normal heel, toe and tandem.    UPDRS  Time:: 0930  Medication: Off  R Brain DBS:: None  L Brain DBS:: None  Dyskinesia (LID): No  Did LID interfere: No  Speech: (0) Normal: No speech problems.  Facial Expression: (0) Normal: Normal facial expression.  Rigidity Neck: (0) Normal: No rigidity.  Rigidity RUE: (0) Normal: No rigidity.  Rigidity LUE: (0) Normal: No rigidity.  Rigidity RLE: (0) Normal: No rigidity.  Rigidity LLE: (0) Normal: No rigidity.  Finger Taps R: (0) Normal: No problems.  Finger Taps L: (0) Normal: No problems.  Hand Mvt R: (0) Normal: No problems.  Hand Mvt L: (0) Normal: No problems.  Pron-/Supinate R: (0) Normal: No problems.  Pron-/Supinate L: (0) Normal: No problems.  Toe Tap R: (0) Normal:  No problems.  Toe Tap L: (0) Normal: No problems.  Leg Agility R: (0) Normal: No problems.  Leg Agility L: (0) Normal: No problems.  Arise From Chair: (0) Normal: No problems. Able to arise quickly without hesitation.  Gait: (0) Normal: No problems.  Gait Freezing: (0) Normal: No freezing.  Postural Stability: (0) Normal: No problems. Recovers with one or two steps.  Posture: (0) Normal: No problems.  Global Spont Mvt: (0) Normal: No problems.  Postural Tremor RUE: (1) Slight: Tremor is present but less than 1 cm in amplitude.  Postural Tremor LUE: (1) Slight: Tremor is present but less than 1 cm in amplitude.  Kinetic Tremor RUE: (1) Slight: Tremor is present but less than 1 cm in amplitude.  Kinetic Tremor LUE: (2) Mild: Tremor is at least 1 but less than 3 cm in amplitude.  Rest Tremor RUE: (0) Normal: No tremor.  Rest Tremor LUE: (0) Normal: No tremor.  Rest Tremor RLE: (0) Normal: No tremor.  Rest Tremor LLE: (0) Normal: No tremor.  Rest Tremor Lip/Jaw: (0) Normal: No tremor.  Rest Tremor Constancy: (0) Normal: No tremor.  Total Right: 2  Total Left: 3  Axial Total: 0  Total: 5        2/6/2024     9:00 AM   Tremor Motor Scale   Assessment Time 09:30   Medication Off   DBS - Right Brain None   DBS - Left Brain None   Head 1.5   Face & Jaw 0   Voice 1   Outstretched - RIGHT 1   Outstretched - LEFT 1.5   Wingbeating - RIGHT 0   Wingbeating - LEFT 0   Kinetic - RIGHT 1   Kinetic - LEFT 1.5   Lower Limb - RIGHT 0   Lower Limb - LEFT 0   Lower Limb (Max) 0   Spiral - RIGHT 0   Spiral - LEFT 1.5   Handwriting 0.5   Dot approx - RIGHT 0   Dot approx - LEFT 1   Trunk (Standing) 0   Total Right 2   Total Left 5.5   Axial 2.5   TOTAL 10.5         DATA REVIEWED:  Reviewed pertinent data available in [facility] including recent labs notable findings include low TSH and low free T4 from a few months ago.    ASSESSMENT:  58-year-old female with what seems to be a longstanding bilateral postural and kinetic tremor disorder, no  resting component, no associated rigidity, bradykinesia, or balance changes.  He seems like the tremors gradually progressed for the past couple of years starting to affect her head more intermittently to the point the surroundings are noticing even though she does not notice herself, as well as affecting her handwriting and some manual tasks, even though the tremor still not deemed to be disabling to her.    The exam does show a slight head tremor, associated with a very mild vocal tremor and bilateral upper extremity tremors there exclusively postural and kinetic, slightly asymmetric.  No convincing dystonic features were appreciated today.  No convincing features of parkinsonism are appreciated today.    Overall constellation of symptoms concerning for a possible essential tremor-like disorder.  There is also supported by the longstanding history of the tremors in her case, associated with strong family history of multiple family members with similar symptoms to hers.    PLAN:  -Reviewed impression and plan patient    - We went over differential diagnosis for tremors, she is provided reassurance that, today, I see no signs of underlying Parkinson disease.  However she is young, and the chance of having parts of the similar to general population.  Therefore, should we notice any changes in her symptoms or any new symptoms there.  The future, we can revisit the diagnosis.    - We talked about pharmacological and nonpharmacological ways to manage tremors.  She was offered occupational therapy and she declined for now.  We talked about different medication options, she does not feel she is disabled enough to justify initiation of pharmacologic intervention at this point.  I think that is reasonable.  We discussed continue to follow that longitudinally and if things would change in the future we can entertain the possibility of starting medication at that point.    - We also talked about the fact that she has a  tendency to have tremors and any internal or external factors can potentially aggravate the tremors temporarily.  We talked about the fact that if her thyroid function is off at this point, perhaps that might be something is driving the tremors up.  I suspect that if we ever get the thyroid situation squared away there is a chance that her tremors may experience some alleviation.  I agree there is a reasonable thing to try to address that first and then perhaps regroup.    - We talked about follow-up, we suggested a 6 to 12-month return for another assessment.  She feels like an annual visit is a reasonable thing to do, and she is going to work on her thyroid function with her primary care physician in the meantime.  We can always reassess or sooner if necessary.  She was encouraged to contact me back in the meantime should there be any question concerns or she experience any changes in her symptoms.    There are no Patient Instructions on file for this visit.      Pio Chow MD (Leo) (he/him/his)   of Neurology  [facility]  Department of Neurology      Thank you for referring Olena King to our [facility] Movement Disorders Program, we appreciate the opportunity of being your partner in healthcare. Please feel free to reach out to us at any point if any questions or concerns about this visit.    Attending attestation: Today I spent a total 60 minutes on this case, with greater than 50% of the time spent in face-to-face, examining, obtaining history, providing education and coordination of care. Additional time was spent in chart review, ancillary data, and documentation as delineated above.      Again, thank you for allowing me to participate in the care of your patient.        Sincerely,        Poi Ash MD

## 2024-02-06 NOTE — PROGRESS NOTES
Department of Neurology  Movement Disorders Division   New Patient Visit    Patient: Olena King   MRN: [id number]   : 1965   Date of Visit: 2024  PCP: Physician No Ref-Primary   Referring provider: Dionte    CC:  Tremor evaluation    HPI:  Ms. King is a 58 year old right-handed female with history significant for thyroid disease who presents to movement disorder clinic for tremors.     She comes in for evaluation of tremors that have been part of her life for as far back as she can remember, she also says that several family members have some shakiness in her hands.  However things seem to have progressed over the past couple years which caught her attention and made her decide to seek medical assistance for.    She endorses tremors affecting bilateral hands they have been longstanding, mostly noticeable occasionally in the context of holding things and doing things with her hands.  No tremors present at rest.  No tremors affecting voice and head trunk or lower extremities throughout her life, except for some head tremors that have taken place for the past year or 2.    No rigidity or bradykinesia.  No posturing that she has noticed.  No issues with pain or muscle spasms.  She denies any change in posture or gait.  She says her balance is really good.  No falls, speech, or swallowing issues.    She denies any change in sense of smell or taste, she denies any constipation or bladder control issues, she denies any RBD-like symptoms.  No cognitive dysfunction or any concerns for any psychiatric comorbidities.    As far as her symptoms go, she endorses that for the past couple of years at times other folks will comment that perhaps her head is shaking a little, even though she does not notice herself.  Further, she has noticed that doing certain things involving her hands such as putting mascara on, as well as writing can get a little challenging in the sense that it takes longer because her hand  tends to shake and she has to stabilize it more.  At times she will catch herself trying to stabilize the hand she is using by trying to abduct the elbow or just stabilize with the contralateral hand to be able to complete the task within a reasonable amount of time.  It seems like it for the past year to year and a half she has noticed that her left hand is shaking more consistently still while doing things such as holding a glass of water and bring it to her mouth.  No resting component has been noticed lately.    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  She feels that the tremors are nondisabling at this point.  However, since her mother had a late in life diagnosis of Parkinson's disease, she was a little concerned which prompted request for an evaluation and opinion on the case because.    No prior occupational exposures, no prior intake of dopamine blocking medications including antipsychotics, antiemetics, or antivertigo medication.  She smokes regularly, she drinks only occasionally denies any prior history of heavy drinking, she denies any intake of any recreational drugs.    As far as her family history goes, she endorses multiple family members with similar tremors affecting her hands.  She denies knowing much about paternal or maternal family history is because they do not live near them.  She does have 2 siblings who have similar tremors and they have shocked her entire life.  She has 1 daughter who also has similar tremors to hers.    Of note, she is under investigation of her thyroid function, in the setting of having a sudden weight loss and apparently her most recent labs did show abnormal thyroid hormone suggestive of thyroid dysfunction, she is still under workup for that.    Given all the above, she has been sent here to be evaluated pertaining her tremors.  ROS:  All others negative except as listed above. Pertinent movement disorders-specific ROS listed above.    Past  Medical History:   Diagnosis Date    Anxiety     History of colonic polyps     Mixed hyperlipidemia     Thyroid nodule         Past Surgical History:   Procedure Laterality Date    HYSTERECTOMY TOTAL ABDOMINAL  01/01/2007          Current Outpatient Medications:     albuterol (PROAIR HFA/PROVENTIL HFA/VENTOLIN HFA) 108 (90 Base) MCG/ACT inhaler, Inhale 2 puffs into the lungs every 4 hours as needed for shortness of breath / dyspnea or wheezing, Disp: 8 g, Rfl: 0    Alum Hydroxide-Mag Trisilicate 80-20 MG CHEW, Take 2 tablets by mouth, Disp: , Rfl:     atorvastatin (LIPITOR) 20 MG tablet, , Disp: , Rfl:     calcium carbonate 500 mg, elemental, 1250 (500 Ca) MG tablet chewable, Chew and Swallow 200-400 mg by mouth every 4 hours as needed for heartburn., Disp: , Rfl:     cetirizine (ZYRTEC) 10 MG tablet, Take by mouth daily as needed, Disp: , Rfl:     ibuprofen (ADVIL/MOTRIN) 200 MG tablet, Take by mouth every 4-6 hours as needed for anti-inflammatory response or mild pain., Disp: , Rfl:     Lactobacillus 0.05-0.05 MG TABS, Take by mouth once daily. One Billion per day, Disp: , Rfl:     MELALEUCA SC, , Disp: , Rfl:     PARoxetine (PAXIL) 20 MG tablet, TAKE 1 TABLET(20 MG) BY MOUTH EVERY MORNING, Disp: 30 tablet, Rfl: 1    tretinoin (RETIN-A) 0.025 % external cream, , Disp: , Rfl:     ALPRAZolam (XANAX) 0.25 MG tablet, TAKE ONE TABLET BY MOUTH ONCE DAILY AS NEEDED FOR ANXIETY (PANIC ATTACKS) (Patient not taking: Reported on 2/6/2024), Disp: 30 tablet, Rfl: 0    nicotine (NICOTROL) 10 MG inhaler, Use 1 cartridge as needed for urge to smoke by puffing over course of 20min.  Use 6-16 cart/day; reduce number of cart/day over 6-12 weeks. (Patient not taking: Reported on 2/6/2024), Disp: 6 each, Rfl: 3     Allergies   Allergen Reactions    Codeine Other (See Comments)    No Clinical Screening - See Comments Other (See Comments)     Animal fur        Family History   Problem Relation Age of Onset    Hypertension Mother      Diabetes Mother     Heart Failure Mother     Parkinsonism Mother     Coronary Artery Disease Father     Cardiomyopathy Brother         amyloidosis    Congenital heart disease Brother         Social History:  She  reports that she has been smoking cigarettes. She has a 40 pack-year smoking history. She has never used smokeless tobacco.     PHYSICAL EXAM:  /76   Pulse 79   Wt 66.4 kg (146 lb 6.4 oz)   LMP 01/01/2009   SpO2 96%   BMI 22.59 kg/m       Gen: She is in no acute distress, respirations appear nonlabored on room air.    NEURO:  MS:  Alert, oriented, appropriate    CN:  PERRLA, EOMI, face symmetric, normal strength and sensation, tongue and palate are midline, SCM 5/5 throughout    Motor:    Normal tone, no fasciculations, strength is 5/5 throughout    Sensation:  Preserved to all modalities in all extremities    Coordination:  Normal finger-nose    Reflexes: 2+ throughout, downgoing toes bilaterally     Gait:  Normal heel, toe and tandem.    UPDRS  Time:: 0930  Medication: Off  R Brain DBS:: None  L Brain DBS:: None  Dyskinesia (LID): No  Did LID interfere: No  Speech: (0) Normal: No speech problems.  Facial Expression: (0) Normal: Normal facial expression.  Rigidity Neck: (0) Normal: No rigidity.  Rigidity RUE: (0) Normal: No rigidity.  Rigidity LUE: (0) Normal: No rigidity.  Rigidity RLE: (0) Normal: No rigidity.  Rigidity LLE: (0) Normal: No rigidity.  Finger Taps R: (0) Normal: No problems.  Finger Taps L: (0) Normal: No problems.  Hand Mvt R: (0) Normal: No problems.  Hand Mvt L: (0) Normal: No problems.  Pron-/Supinate R: (0) Normal: No problems.  Pron-/Supinate L: (0) Normal: No problems.  Toe Tap R: (0) Normal: No problems.  Toe Tap L: (0) Normal: No problems.  Leg Agility R: (0) Normal: No problems.  Leg Agility L: (0) Normal: No problems.  Arise From Chair: (0) Normal: No problems. Able to arise quickly without hesitation.  Gait: (0) Normal: No problems.  Gait Freezing: (0) Normal: No  freezing.  Postural Stability: (0) Normal: No problems. Recovers with one or two steps.  Posture: (0) Normal: No problems.  Global Spont Mvt: (0) Normal: No problems.  Postural Tremor RUE: (1) Slight: Tremor is present but less than 1 cm in amplitude.  Postural Tremor LUE: (1) Slight: Tremor is present but less than 1 cm in amplitude.  Kinetic Tremor RUE: (1) Slight: Tremor is present but less than 1 cm in amplitude.  Kinetic Tremor LUE: (2) Mild: Tremor is at least 1 but less than 3 cm in amplitude.  Rest Tremor RUE: (0) Normal: No tremor.  Rest Tremor LUE: (0) Normal: No tremor.  Rest Tremor RLE: (0) Normal: No tremor.  Rest Tremor LLE: (0) Normal: No tremor.  Rest Tremor Lip/Jaw: (0) Normal: No tremor.  Rest Tremor Constancy: (0) Normal: No tremor.  Total Right: 2  Total Left: 3  Axial Total: 0  Total: 5        2/6/2024     9:00 AM   Tremor Motor Scale   Assessment Time 09:30   Medication Off   DBS - Right Brain None   DBS - Left Brain None   Head 1.5   Face & Jaw 0   Voice 1   Outstretched - RIGHT 1   Outstretched - LEFT 1.5   Wingbeating - RIGHT 0   Wingbeating - LEFT 0   Kinetic - RIGHT 1   Kinetic - LEFT 1.5   Lower Limb - RIGHT 0   Lower Limb - LEFT 0   Lower Limb (Max) 0   Spiral - RIGHT 0   Spiral - LEFT 1.5   Handwriting 0.5   Dot approx - RIGHT 0   Dot approx - LEFT 1   Trunk (Standing) 0   Total Right 2   Total Left 5.5   Axial 2.5   TOTAL 10.5         DATA REVIEWED:  Reviewed pertinent data available in [facility] including recent labs notable findings include low TSH and low free T4 from a few months ago.    ASSESSMENT:  58-year-old female with what seems to be a longstanding bilateral postural and kinetic tremor disorder, no resting component, no associated rigidity, bradykinesia, or balance changes.  He seems like the tremors gradually progressed for the past couple of years starting to affect her head more intermittently to the point the surroundings are noticing even though she does not notice herself,  as well as affecting her handwriting and some manual tasks, even though the tremor still not deemed to be disabling to her.    The exam does show a slight head tremor, associated with a very mild vocal tremor and bilateral upper extremity tremors there exclusively postural and kinetic, slightly asymmetric.  No convincing dystonic features were appreciated today.  No convincing features of parkinsonism are appreciated today.    Overall constellation of symptoms concerning for a possible essential tremor-like disorder.  There is also supported by the longstanding history of the tremors in her case, associated with strong family history of multiple family members with similar symptoms to hers.    PLAN:  -Reviewed impression and plan patient    - We went over differential diagnosis for tremors, she is provided reassurance that, today, I see no signs of underlying Parkinson disease.  However she is young, and the chance of having parts of the similar to general population.  Therefore, should we notice any changes in her symptoms or any new symptoms there.  The future, we can revisit the diagnosis.    - We talked about pharmacological and nonpharmacological ways to manage tremors.  She was offered occupational therapy and she declined for now.  We talked about different medication options, she does not feel she is disabled enough to justify initiation of pharmacologic intervention at this point.  I think that is reasonable.  We discussed continue to follow that longitudinally and if things would change in the future we can entertain the possibility of starting medication at that point.    - We also talked about the fact that she has a tendency to have tremors and any internal or external factors can potentially aggravate the tremors temporarily.  We talked about the fact that if her thyroid function is off at this point, perhaps that might be something is driving the tremors up.  I suspect that if we ever get the thyroid  situation squared away there is a chance that her tremors may experience some alleviation.  I agree there is a reasonable thing to try to address that first and then perhaps regroup.    - We talked about follow-up, we suggested a 6 to 12-month return for another assessment.  She feels like an annual visit is a reasonable thing to do, and she is going to work on her thyroid function with her primary care physician in the meantime.  We can always reassess or sooner if necessary.  She was encouraged to contact me back in the meantime should there be any question concerns or she experience any changes in her symptoms.    There are no Patient Instructions on file for this visit.      Pio Chow MD (Leo) (he/him/his)   of Neurology  [facility]  Department of Neurology      Thank you for referring Olena CHANELL Jessicaniles to our [facility] Movement Disorders Program, we appreciate the opportunity of being your partner in healthcare. Please feel free to reach out to us at any point if any questions or concerns about this visit.    Attending attestation: Today I spent a total 60 minutes on this case, with greater than 50% of the time spent in face-to-face, examining, obtaining history, providing education and coordination of care. Additional time was spent in chart review, ancillary data, and documentation as delineated above.

## 2024-02-06 NOTE — NURSING NOTE
"Olena King is a 58 year old female who presents for:  Chief Complaint   Patient presents with    Tremors     Tremor / Ref by AMBER PICKERING / Candace in Epic / Sched per Pt   11/27 Tremor / Ref by AMBER PICKERING / Candace in Epic / ReSched per Pt           Initial Vitals:  /76   Pulse 79   Wt 66.4 kg (146 lb 6.4 oz)   LMP 01/01/2009   SpO2 96%   BMI 22.59 kg/m   Estimated body mass index is 22.59 kg/m  as calculated from the following:    Height as of 7/28/23: 1.715 m (5' 7.5\").    Weight as of this encounter: 66.4 kg (146 lb 6.4 oz).. Body surface area is 1.78 meters squared. BP completed using cuff size: regular  Data Unavailable    Nursing Comments:     Lorna Kent MA   "

## 2024-02-08 DIAGNOSIS — E05.10 TOXIC ADENOMA: Primary | ICD-10-CM

## 2024-03-12 NOTE — PROGRESS NOTES
=======================================================  Assessment     Olena King is a 58 year old female with a past medical history significant for anxiety, seen for evaluation of hypothyroidism in the context of a longstanding history of a thyroid nodule.    Toxic nodule   The diagnosis is based on the thyroid uptake and scan from November 2023.  The nodule was first diagnosed in 2010, when it measured 2.9 cm.  It has minimally increased in size over the years, measuring 3.5 cm most recent ultrasound from November 2023.  It was benign on 3 prior biopsies.  On lab work from July September 2023, the lab work is suggestive of mild hypothyroidism, as the TSH was suppressed and free T3 was normal. The free T4 was unexpectedly low and I suspect this might be an assay interference.  Clinically, the patient endorses worsening tremor and anxiety noticed over the last few months.    I counseled the patient on signs and symptoms of hyperthyroidism, treatment options, with radioiodine treatment being referred for toxic adenomas.  Also discussed about long-term consequences of untreated hypothyroidism cardiac and bone health.  The contact radiation precautions were reviewed with the patient.  Following radioiodine treatment, she most likely is going to maintain her thyroid hormone levels.  However, hypothyroidism can occur.    Plan:  Recheck the thyroid hormone levels today, including free T4 by equilibrium dialysis  If the thyroid hormone levels remain minimally elevated, monitoring is reasonable.  At this time, the patient is undecided about treatment.  Online resources about her diagnosis were provided.      Orders Placed This Encounter   Procedures    TSH    T4 free    T3 total    T3 Free    Thyroxine Free by Equilibrium Dialysis     =======================================================  The patient is seen in consultation at the request of Dr. Allison Rapp.  She is accompanied by her .    History of  Present Illness:  Olena King is a 58 year old female with a past medical history significant for anxiety, mixed hyperlipidemia and colon polyps, seen for evaluation of a thyroid nodule.    Olena was first diagnosed with a 2.9 cm right thyroid nodule in 2010.  The nodule was reportedly benign on the biopsy from 2011.  Subsequent thyroid ultrasounds were done in 2016, 2019, 2020 and November 2023.  The nodule was benign again on the biopsies from 2016 and 2020.  The patient maintained a normal TSH until September 2021, when TSH was mildly low at 0.41 (normal range 0.47-5) and free T4 was normal at 0.73.  On subsequent lab work from July 2023, free T4 was mildly low at 0.87 and TSH was suppressed at 0.17.  In September 2023, TSH was suppressed at 0.18, free T4 was low at 0.84, total T4 was normal at 5.6 (4.5-11.7) and free T3 was normal at 3.1.    On the ultrasound images from November 2023, the right thyroid lobe nodule measured 3.5 cm in maximum diameter and it was mixed, with areas of comet tail artifact and no definite calcifications.  It occupied most of the right thyroid lobe.  The thyroid uptake and scan from 2/5/2024 revealed focal uptake corresponding to the right thyroid nodule, with suppression of the remaining thyroid parenchyma.  The 24-hour uptake was 16.6%.  I reviewed both the ultrasound and the thyroid uptake and scan images.    The patient has been taking 3-4 over-the-counter supplements, Melaleuca brand.  She reports drinking a lot of milk (4 to 5 glasses a day) and recently has decided to change to oat milk as she was concerned about the high iodine intake.    Olena endorses an unexpected weight loss of 15 pounds over the last 3 to 4 months.  On our records, her weight has been stable since July 2023.  She has a longstanding history of tremor of the hands, more pronounced in the left, for as long as she can remember.  However, in the last 6 months, the tremor has worsened.  There is no prior  history of radiation exposure or family history of thyroid cancer.    Past Medical History   Past Medical History:   Diagnosis Date    Anxiety     History of colonic polyps     Mixed hyperlipidemia     Thyroid nodule    Asthma  Right ankle fracture in her 30s      Past Surgical History   Past Surgical History:   Procedure Laterality Date    HYSTERECTOMY TOTAL ABDOMINAL  01/01/2007   Total hysterectomy at age 42 - not treated with HRT     Current Medications    Current Outpatient Medications:     albuterol (PROAIR HFA/PROVENTIL HFA/VENTOLIN HFA) 108 (90 Base) MCG/ACT inhaler, Inhale 2 puffs into the lungs every 4 hours as needed for shortness of breath / dyspnea or wheezing, Disp: 8 g, Rfl: 0    ALPRAZolam (XANAX) 0.25 MG tablet, TAKE ONE TABLET BY MOUTH ONCE DAILY AS NEEDED FOR ANXIETY (PANIC ATTACKS) (Patient not taking: Reported on 2/6/2024), Disp: 30 tablet, Rfl: 0    Alum Hydroxide-Mag Trisilicate 80-20 MG CHEW, Take 2 tablets by mouth, Disp: , Rfl:     atorvastatin (LIPITOR) 20 MG tablet, , Disp: , Rfl:     calcium carbonate 500 mg, elemental, 1250 (500 Ca) MG tablet chewable, Chew and Swallow 200-400 mg by mouth every 4 hours as needed for heartburn., Disp: , Rfl:     cetirizine (ZYRTEC) 10 MG tablet, Take by mouth daily as needed, Disp: , Rfl:     ibuprofen (ADVIL/MOTRIN) 200 MG tablet, Take by mouth every 4-6 hours as needed for anti-inflammatory response or mild pain., Disp: , Rfl:     Lactobacillus 0.05-0.05 MG TABS, Take by mouth once daily. One Billion per day, Disp: , Rfl:     MELALEUCA SC, , Disp: , Rfl:     nicotine (NICOTROL) 10 MG inhaler, Use 1 cartridge as needed for urge to smoke by puffing over course of 20min.  Use 6-16 cart/day; reduce number of cart/day over 6-12 weeks. (Patient not taking: Reported on 2/6/2024), Disp: 6 each, Rfl: 3    PARoxetine (PAXIL) 20 MG tablet, TAKE 1 TABLET(20 MG) BY MOUTH EVERY MORNING, Disp: 30 tablet, Rfl: 1    tretinoin (RETIN-A) 0.025 % external cream, , Disp: ,  Rfl:     Family History   Problem Relation Age of Onset    Hypertension Mother     Diabetes 2  Mother     Heart Failure Mother     Parkinsonism Mother     Coronary Artery Disease Father     Cardiomyopathy Brother         amyloidosis    Congenital heart disease - Fallot  Brother    Father - thyroid disease (?  Substernal goiter). Grandfather - prostate cancer.     Social History  .  She has 3 children.  She smokes for >40 years, 1 PPD.  She drinks alcohol in weekends, occasionally. Denies using illicit drugs. Occupation: Traveling nurse.    Review of Systems   Systemic:             fatigue for the last 6-12 months; she sleeps well, 7-8 hrs nightly    Eye:                      occasional dry eyes noticed recently   Rosa Elena-Laryngeal:     No rosa elena-laryngeal symptoms, no dysphagia, no hoarseness, no cough     Breast:                  No breast symptoms  Cardiovascular:    occasional epigastric CP, no palpitations   Pulmonary:           No pulmonary symptoms, no SOB or cough    Gastrointestinal:   occasional episodes of diarrhea or constipation   Genitourinary:       No genitourinary symptoms, no increased thirst or urination   Endocrine:            longstanding cold intolerance, unchanged    Neurological:        occasional headaches, no numbness or tingling sensation, no dizziness   Musculoskeletal:  occasional knee joint pain   Skin:                    worsening dry skin, no hair falling out   Psychological:     anxiety controlled by paroxetine               Vital Signs     Previous Weights:    Wt Readings from Last 10 Encounters:   03/13/24 65.8 kg (145 lb)   02/06/24 66.4 kg (146 lb 6.4 oz)   07/28/23 66.7 kg (147 lb)   09/15/11 62.6 kg (138 lb)        /76 (BP Location: Left arm, Patient Position: Sitting, Cuff Size: Adult Regular)   Pulse 80   Wt 65.8 kg (145 lb)   LMP 01/01/2009   SpO2 97%   BMI 22.38 kg/m      Physical Exam  General Appearance: she is well developed, well nourished and in no distress      Eyes:  conjutivae and extra-ocular motions are normal.                                    pupils round and reactive to light, no lid lag, no stare    HEENT:   oropharynx clear and moist, no JVD, no bruits     visible and palpable 3 to 4 cm right thyroid nodule, firm and mobile  Cardiovascular:  regular rhythm, no murmurs, distal pulse palpable, no edema  Respiratory:        chest clear, no rales, no rhonchi   Musculoskeletal:  normal tone and strength  Psychological:          affect and judgment normal  Skin:  warm, no lesions  Neurological:  reflexes normal and symmetric, coarse resting tremor of the outstretched hands    Lab Results  I reviewed prior lab results documented in Epic.  TSH   Date Value Ref Range Status   09/25/2023 0.18 (L) 0.30 - 4.20 uIU/mL Final   07/28/2023 0.17 (L) 0.30 - 4.20 uIU/mL Final     62 minutes spent on the date of the encounter doing chart review, history and exam, documentation and further activities as noted above.

## 2024-03-13 ENCOUNTER — OFFICE VISIT (OUTPATIENT)
Dept: ENDOCRINOLOGY | Facility: CLINIC | Age: 59
End: 2024-03-13
Attending: FAMILY MEDICINE
Payer: COMMERCIAL

## 2024-03-13 VITALS
SYSTOLIC BLOOD PRESSURE: 113 MMHG | HEART RATE: 80 BPM | DIASTOLIC BLOOD PRESSURE: 76 MMHG | WEIGHT: 145 LBS | BODY MASS INDEX: 22.38 KG/M2 | OXYGEN SATURATION: 97 %

## 2024-03-13 DIAGNOSIS — E05.10 TOXIC ADENOMA: Primary | ICD-10-CM

## 2024-03-13 DIAGNOSIS — E05.90 SUBCLINICAL HYPERTHYROIDISM: ICD-10-CM

## 2024-03-13 LAB
T3 SERPL-MCNC: 104 NG/DL (ref 85–202)
T3FREE SERPL-MCNC: 3 PG/ML (ref 2–4.4)
T4 FREE SERPL-MCNC: 0.93 NG/DL (ref 0.9–1.7)
TSH SERPL DL<=0.005 MIU/L-ACNC: 0.35 UIU/ML (ref 0.3–4.2)

## 2024-03-13 PROCEDURE — 84439 ASSAY OF FREE THYROXINE: CPT | Performed by: INTERNAL MEDICINE

## 2024-03-13 PROCEDURE — 99205 OFFICE O/P NEW HI 60 MIN: CPT | Performed by: INTERNAL MEDICINE

## 2024-03-13 PROCEDURE — 84443 ASSAY THYROID STIM HORMONE: CPT | Performed by: INTERNAL MEDICINE

## 2024-03-13 PROCEDURE — 84481 FREE ASSAY (FT-3): CPT | Performed by: INTERNAL MEDICINE

## 2024-03-13 PROCEDURE — 36415 COLL VENOUS BLD VENIPUNCTURE: CPT | Performed by: INTERNAL MEDICINE

## 2024-03-13 PROCEDURE — 99000 SPECIMEN HANDLING OFFICE-LAB: CPT | Performed by: INTERNAL MEDICINE

## 2024-03-13 NOTE — LETTER
3/13/2024         RE: Olena King  [address]   [facility] [address]        Dear Colleague,    Thank you for referring your patient, Olena King, to the [facility]. Please see a copy of my visit note below.    =======================================================  Assessment     Olena King is a 58 year old female with a past medical history significant for anxiety, seen for evaluation of hypothyroidism in the context of a longstanding history of a thyroid nodule.    Toxic nodule   The diagnosis is based on the thyroid uptake and scan from November 2023.  The nodule was first diagnosed in 2010, when it measured 2.9 cm.  It has minimally increased in size over the years, measuring 3.5 cm most recent ultrasound from November 2023.  It was benign on 3 prior biopsies.  On lab work from July September 2023, the lab work is suggestive of mild hypothyroidism, as the TSH was suppressed and free T3 was normal. The free T4 was unexpectedly low and I suspect this might be an assay interference.  Clinically, the patient endorses worsening tremor and anxiety noticed over the last few months.    I counseled the patient on signs and symptoms of hyperthyroidism, treatment options, with radioiodine treatment being referred for toxic adenomas.  Also discussed about long-term consequences of untreated hypothyroidism cardiac and bone health.  The contact radiation precautions were reviewed with the patient.  Following radioiodine treatment, she most likely is going to maintain her thyroid hormone levels.  However, hypothyroidism can occur.    Plan:  Recheck the thyroid hormone levels today, including free T4 by equilibrium dialysis  If the thyroid hormone levels remain minimally elevated, monitoring is reasonable.  At this time, the patient is undecided about treatment.  Online resources about her diagnosis were provided.      Orders Placed This Encounter   Procedures     TSH     T4 free     T3  total     T3 Free     Thyroxine Free by Equilibrium Dialysis     =======================================================  The patient is seen in consultation at the request of Dr. Allison Rapp.  She is accompanied by her .    History of Present Illness:  Olena King is a 58 year old female with a past medical history significant for anxiety, mixed hyperlipidemia and colon polyps, seen for evaluation of a thyroid nodule.    Olena was first diagnosed with a 2.9 cm right thyroid nodule in 2010.  The nodule was reportedly benign on the biopsy from 2011.  Subsequent thyroid ultrasounds were done in 2016, 2019, 2020 and November 2023.  The nodule was benign again on the biopsies from 2016 and 2020.  The patient maintained a normal TSH until September 2021, when TSH was mildly low at 0.41 (normal range 0.47-5) and free T4 was normal at 0.73.  On subsequent lab work from July 2023, free T4 was mildly low at 0.87 and TSH was suppressed at 0.17.  In September 2023, TSH was suppressed at 0.18, free T4 was low at 0.84, total T4 was normal at 5.6 (4.5-11.7) and free T3 was normal at 3.1.    On the ultrasound images from November 2023, the right thyroid lobe nodule measured 3.5 cm in maximum diameter and it was mixed, with areas of comet tail artifact and no definite calcifications.  It occupied most of the right thyroid lobe.  The thyroid uptake and scan from 2/5/2024 revealed focal uptake corresponding to the right thyroid nodule, with suppression of the remaining thyroid parenchyma.  The 24-hour uptake was 16.6%.  I reviewed both the ultrasound and the thyroid uptake and scan images.    The patient has been taking 3-4 over-the-counter supplements, Melaleuca brand.  She reports drinking a lot of milk (4 to 5 glasses a day) and recently has decided to change to oat milk as she was concerned about the high iodine intake.    Olena endorses an unexpected weight loss of 15 pounds over the last 3 to 4 months.  On our  records, her weight has been stable since July 2023.  She has a longstanding history of tremor of the hands, more pronounced in the left, for as long as she can remember.  However, in the last 6 months, the tremor has worsened.  There is no prior history of radiation exposure or family history of thyroid cancer.    Past Medical History   Past Medical History:   Diagnosis Date     Anxiety      History of colonic polyps      Mixed hyperlipidemia      Thyroid nodule    Asthma  Right ankle fracture in her 30s      Past Surgical History   Past Surgical History:   Procedure Laterality Date     HYSTERECTOMY TOTAL ABDOMINAL  01/01/2007   Total hysterectomy at age 42 - not treated with HRT     Current Medications    Current Outpatient Medications:      albuterol (PROAIR HFA/PROVENTIL HFA/VENTOLIN HFA) 108 (90 Base) MCG/ACT inhaler, Inhale 2 puffs into the lungs every 4 hours as needed for shortness of breath / dyspnea or wheezing, Disp: 8 g, Rfl: 0     ALPRAZolam (XANAX) 0.25 MG tablet, TAKE ONE TABLET BY MOUTH ONCE DAILY AS NEEDED FOR ANXIETY (PANIC ATTACKS) (Patient not taking: Reported on 2/6/2024), Disp: 30 tablet, Rfl: 0     Alum Hydroxide-Mag Trisilicate 80-20 MG CHEW, Take 2 tablets by mouth, Disp: , Rfl:      atorvastatin (LIPITOR) 20 MG tablet, , Disp: , Rfl:      calcium carbonate 500 mg, elemental, 1250 (500 Ca) MG tablet chewable, Chew and Swallow 200-400 mg by mouth every 4 hours as needed for heartburn., Disp: , Rfl:      cetirizine (ZYRTEC) 10 MG tablet, Take by mouth daily as needed, Disp: , Rfl:      ibuprofen (ADVIL/MOTRIN) 200 MG tablet, Take by mouth every 4-6 hours as needed for anti-inflammatory response or mild pain., Disp: , Rfl:      Lactobacillus 0.05-0.05 MG TABS, Take by mouth once daily. One Billion per day, Disp: , Rfl:      MELALEUCA SC, , Disp: , Rfl:      nicotine (NICOTROL) 10 MG inhaler, Use 1 cartridge as needed for urge to smoke by puffing over course of 20min.  Use 6-16 cart/day; reduce  number of cart/day over 6-12 weeks. (Patient not taking: Reported on 2/6/2024), Disp: 6 each, Rfl: 3     PARoxetine (PAXIL) 20 MG tablet, TAKE 1 TABLET(20 MG) BY MOUTH EVERY MORNING, Disp: 30 tablet, Rfl: 1     tretinoin (RETIN-A) 0.025 % external cream, , Disp: , Rfl:     Family History   Problem Relation Age of Onset     Hypertension Mother      Diabetes 2  Mother      Heart Failure Mother      Parkinsonism Mother      Coronary Artery Disease Father      Cardiomyopathy Brother         amyloidosis     Congenital heart disease - Fallot  Brother    Father - thyroid disease (?  Substernal goiter). Grandfather - prostate cancer.     Social History  .  She has 3 children.  She smokes for >40 years, 1 PPD.  She drinks alcohol in weekends, occasionally. Denies using illicit drugs. Occupation: Traveling nurse.    Review of Systems   Systemic:             fatigue for the last 6-12 months; she sleeps well, 7-8 hrs nightly    Eye:                      occasional dry eyes noticed recently   Rosa Elena-Laryngeal:     No rosa elena-laryngeal symptoms, no dysphagia, no hoarseness, no cough     Breast:                  No breast symptoms  Cardiovascular:    occasional epigastric CP, no palpitations   Pulmonary:           No pulmonary symptoms, no SOB or cough    Gastrointestinal:   occasional episodes of diarrhea or constipation   Genitourinary:       No genitourinary symptoms, no increased thirst or urination   Endocrine:            longstanding cold intolerance, unchanged    Neurological:        occasional headaches, no numbness or tingling sensation, no dizziness   Musculoskeletal:  occasional knee joint pain   Skin:                    worsening dry skin, no hair falling out   Psychological:     anxiety controlled by paroxetine               Vital Signs     Previous Weights:    Wt Readings from Last 10 Encounters:   03/13/24 65.8 kg (145 lb)   02/06/24 66.4 kg (146 lb 6.4 oz)   07/28/23 66.7 kg (147 lb)   09/15/11 62.6 kg (138 lb)         /76 (BP Location: Left arm, Patient Position: Sitting, Cuff Size: Adult Regular)   Pulse 80   Wt 65.8 kg (145 lb)   LMP 01/01/2009   SpO2 97%   BMI 22.38 kg/m      Physical Exam  General Appearance: she is well developed, well nourished and in no distress     Eyes:  conjutivae and extra-ocular motions are normal.                                    pupils round and reactive to light, no lid lag, no stare    HEENT:   oropharynx clear and moist, no JVD, no bruits     visible and palpable 3 to 4 cm right thyroid nodule, firm and mobile  Cardiovascular:  regular rhythm, no murmurs, distal pulse palpable, no edema  Respiratory:        chest clear, no rales, no rhonchi   Musculoskeletal:  normal tone and strength  Psychological:          affect and judgment normal  Skin:  warm, no lesions  Neurological:  reflexes normal and symmetric, coarse resting tremor of the outstretched hands    Lab Results  I reviewed prior lab results documented in Epic.  TSH   Date Value Ref Range Status   09/25/2023 0.18 (L) 0.30 - 4.20 uIU/mL Final   07/28/2023 0.17 (L) 0.30 - 4.20 uIU/mL Final     62 minutes spent on the date of the encounter doing chart review, history and exam, documentation and further activities as noted above.                          Again, thank you for allowing me to participate in the care of your patient.        Sincerely,        Marlene Foreman MD

## 2024-03-13 NOTE — PATIENT INSTRUCTIONS
In order to minimize exposure to radiation to others from the radioiodine inside your body, you should do the following for 5 days after the radioiodine treatment.     Sleep alone.  Kissing and sexual intercourse should be avoided  Avoid prolonged close contact with young children and pregnant women.  Wash your hands with soap and water after each visit to the bathroom  Flush the toilet 2 times after each use  Rinse the bathroom sink and tub after use  Drink plenty of liquids to assist in the removal of radioactive material that are circulating in the blood stream  Use separate eating utensils and wash them separately  Use separate towels and wash cloths and wash them separately to avoid cross contamination.  Avoid public transportation  In order to minimize exposure to radiation to others from the radioiodine inside your body, you should do the following for 5 days following the administration of radioiodine:  Sleep alone.  Kissing and sexual intercourse should be avoided  Avoid prolonged close contact with young children and pregnant women.  Wash your hands with soap and water after each visit to the bathroom  Flush the toilet 2 times after each use  Rinse the bathroom sink and tub after use  Drink plenty of liquids to assist in the removal of radioactive material that are circulating in the blood stream  Use separate eating utensils and wash them separately  Use separate towels and wash cloths and wash them separately to avoid cross contamination.  Avoid public transportation     Here is where you can read more about Hyprethyroidism/Toxic nodule:  [email or web address]/hyperthyroidism/  [email or web address].org/patient-engagement/endocrine-library/graves-disease    Welcome to the [facility] Endocrinology and Diabetes Clinics     Our Endocrinology Clinics are here to provide you with a team-based, collaborative approach in the diagnosis and treatment of patients with diabetes and endocrine disorders.  The team is made up of Physicians, Physician Assistants, Certified Diabetes Educators, Registered Nurses, Medical Assistants, Emergency Medical Technicians, and many others, all of whom have the unified goal of providing our patients with high quality care.     Please see below for some helpful tips to best navigate and use the [facility] Endocrinology clinic:     [place] Respect: At [facility], we are committed to a respectful and safe space for all patients, visitors, and staff.  We believe that mutual respect between patients and their care team is the foundation of quality care.  It is our expectation that you will be treated with respect by your care team.  In turn, we ask that all communication with the care team (written and verbal) be respectful and free from profanity, threatening, or abusive language.  Disrespectful communication undermines our therapeutic relationship with you and may result in us being unable to continue to provide your care.    Refills: A provider must see you at least annually to prescribe and refill medications. This is to ensure your safety as well as meet insurance and compliance regulations.    Scheduling: Many of our Providers offer both in-person or video visits. Please call to schedule any needed follow ups as soon as possible because our provider schedules fill up very quickly. Our care team has the right to require an in-person visit when they believe that it is medically necessary. Please remember that for any virtual visits, you must be in the state [facility] at the time of the visit, otherwise we are unable to see you and you will need to be rescheduled.    Missed Appointments: If you need to cancel or miss your scheduled appointment, please call the clinic at [phone] to reschedule.  Please note if you repeatedly miss appointments or repeatedly miss appointments without calling to inform us ahead of time (no-show), the clinic may elect to not allow you  to reschedule without speaking to a manager, may require a Partnership In Care Agreement prior to rescheduling, or could result in you no longer being able to receive care from the clinic. Providing the clinic with timely notification if you have to miss an appointment, allows us to better serve the needs of all of our patients.    Primary Care Provider: Our Endocrinologists are Specialists in their field. We expect you to have a Primary Care Provider established to handle any needs outside of your diabetes and endocrine care.  We would be happy to assist you find a Primary Care Provider, if you do not have one.    [email or web address]: Golf[employer] is a wonderful resource that allows you access to your Care Team via online or the citlaly. Please ask a member of the team if you would like help creating an account. Please note that it may take up to 2 business days for a response. [employer] messages are not reviewed on weekends or after business hours.  Emergent or urgent care needs should never be communicated via [employer].  If you experience a medical emergency call 911 or go to the nearest emergency room.    Labs: It is recommended that you stay within the [facility] for labs but you are welcome to obtain ordered labs (with some exceptions) from any location of your choice as long as they are able to complete and process the needed labs. If you need us to fax orders to your preferred lab, please provide us the name and fax number of the lab you would like to go to so we can fax the orders. If your labs are drawn outside of the [facility], please have them fax the results to [phone] ([place]) or [phone] ([place]) or via [facility]. It is your responsibility to ensure that outside lab results are sent to us.    We look forward to working with you. Please do not hesitate to reach out with any questions.    Thank you,    The Endocrine Team    [facility] Address:   [place] Address:     [address]  [place] [address]    Phone: [phone]  Fax: [phone] [address] [place] [address]    Phone: [phone]  Fax: [phone]     Kalin Gabi Cost Estimate Phone Number: [phone]    General Lab and Imaging Scheduling Phone Number: [phone]

## 2024-03-13 NOTE — NURSING NOTE
Olena King's goals for this visit include:   Chief Complaint   Patient presents with    New Patient     Toxic Adenoma     She requests these members of her care team be copied on today's visit information: Yes     PCP: Allison Rapp    Referring Provider:  Allison Rapp DO  [address] [place] [address]    /76 (BP Location: Left arm, Patient Position: Sitting, Cuff Size: Adult Regular)   Pulse 80   Wt 65.8 kg (145 lb)   LMP 01/01/2009   SpO2 97%   BMI 22.38 kg/m      Do you need any medication refills at today's visit? JACQUES Kent, MARGE  Adult Endocrinology   [facility]

## 2024-03-15 NOTE — RESULT ENCOUNTER NOTE
Good news!  The thyroid hormone levels are normal.  They are trending towards the upper end of normal but they are still in the normal range, so no intervention is required.  I recommend to have the TSH rechecked in 6 months.

## 2024-03-17 LAB — T4 FREE SERPL DIALY-MCNC: 1 NG/DL

## 2024-05-02 DIAGNOSIS — E78.2 MIXED HYPERLIPIDEMIA: Primary | ICD-10-CM

## 2024-05-02 RX ORDER — ATORVASTATIN CALCIUM 20 MG/1
20 TABLET, FILM COATED ORAL DAILY
Qty: 90 TABLET | Refills: 0 | Status: SHIPPED | OUTPATIENT
Start: 2024-05-02 | End: 2024-08-16

## 2024-05-31 DIAGNOSIS — F32.A DEPRESSION, UNSPECIFIED DEPRESSION TYPE: ICD-10-CM

## 2024-05-31 RX ORDER — PAROXETINE 20 MG/1
20 TABLET, FILM COATED ORAL EVERY MORNING
Qty: 30 TABLET | Refills: 0 | Status: SHIPPED | OUTPATIENT
Start: 2024-05-31 | End: 2024-07-22

## 2024-07-09 ENCOUNTER — PATIENT OUTREACH (OUTPATIENT)
Dept: CARE COORDINATION | Facility: CLINIC | Age: 59
End: 2024-07-09
Payer: COMMERCIAL

## 2024-07-20 DIAGNOSIS — F32.A DEPRESSION, UNSPECIFIED DEPRESSION TYPE: ICD-10-CM

## 2024-07-22 RX ORDER — PAROXETINE 20 MG/1
20 TABLET, FILM COATED ORAL EVERY MORNING
Qty: 30 TABLET | Refills: 1 | Status: SHIPPED | OUTPATIENT
Start: 2024-07-22 | End: 2024-08-16

## 2024-07-22 NOTE — TELEPHONE ENCOUNTER
Short refill provided-please notify patient she needs appt for any further refills.Thank you!  AMBER PICKERING, DO

## 2024-07-23 ENCOUNTER — PATIENT OUTREACH (OUTPATIENT)
Dept: CARE COORDINATION | Facility: CLINIC | Age: 59
End: 2024-07-23
Payer: COMMERCIAL

## 2024-08-14 ENCOUNTER — PATIENT OUTREACH (OUTPATIENT)
Dept: CARE COORDINATION | Facility: CLINIC | Age: 59
End: 2024-08-14
Payer: COMMERCIAL

## 2024-08-16 ENCOUNTER — OFFICE VISIT (OUTPATIENT)
Dept: FAMILY MEDICINE | Facility: CLINIC | Age: 59
End: 2024-08-16
Payer: COMMERCIAL

## 2024-08-16 VITALS
SYSTOLIC BLOOD PRESSURE: 104 MMHG | OXYGEN SATURATION: 95 % | BODY MASS INDEX: 22.37 KG/M2 | HEIGHT: 68 IN | WEIGHT: 147.6 LBS | RESPIRATION RATE: 14 BRPM | DIASTOLIC BLOOD PRESSURE: 72 MMHG | HEART RATE: 83 BPM

## 2024-08-16 DIAGNOSIS — F32.A DEPRESSION, UNSPECIFIED DEPRESSION TYPE: Primary | ICD-10-CM

## 2024-08-16 DIAGNOSIS — Z87.891 PERSONAL HISTORY OF TOBACCO USE: ICD-10-CM

## 2024-08-16 DIAGNOSIS — E78.2 MIXED HYPERLIPIDEMIA: ICD-10-CM

## 2024-08-16 DIAGNOSIS — F17.200 TOBACCO USE DISORDER: ICD-10-CM

## 2024-08-16 LAB
ALT SERPL W P-5'-P-CCNC: 26 U/L (ref 0–50)
AST SERPL W P-5'-P-CCNC: 29 U/L (ref 0–45)

## 2024-08-16 PROCEDURE — 99214 OFFICE O/P EST MOD 30 MIN: CPT | Performed by: FAMILY MEDICINE

## 2024-08-16 PROCEDURE — 36415 COLL VENOUS BLD VENIPUNCTURE: CPT | Performed by: FAMILY MEDICINE

## 2024-08-16 PROCEDURE — 84450 TRANSFERASE (AST) (SGOT): CPT | Performed by: FAMILY MEDICINE

## 2024-08-16 PROCEDURE — 84460 ALANINE AMINO (ALT) (SGPT): CPT | Performed by: FAMILY MEDICINE

## 2024-08-16 PROCEDURE — G0296 VISIT TO DETERM LDCT ELIG: HCPCS | Performed by: FAMILY MEDICINE

## 2024-08-16 RX ORDER — ATORVASTATIN CALCIUM 20 MG/1
20 TABLET, FILM COATED ORAL DAILY
Qty: 90 TABLET | Refills: 3 | Status: SHIPPED | OUTPATIENT
Start: 2024-08-16

## 2024-08-16 RX ORDER — PAROXETINE 20 MG/1
20 TABLET, FILM COATED ORAL EVERY MORNING
Qty: 90 TABLET | Refills: 3 | Status: SHIPPED | OUTPATIENT
Start: 2024-08-16

## 2024-08-16 ASSESSMENT — PATIENT HEALTH QUESTIONNAIRE - PHQ9
SUM OF ALL RESPONSES TO PHQ QUESTIONS 1-9: 0
SUM OF ALL RESPONSES TO PHQ QUESTIONS 1-9: 0

## 2024-08-16 NOTE — PROGRESS NOTES
Assessment & Plan     Depression, unspecified depression type  Doing well at current dose, refills provided  - PARoxetine (PAXIL) 20 MG tablet  Dispense: 90 tablet; Refill: 3    Mixed hyperlipidemia  Due for recheck of lipid panel  - atorvastatin (LIPITOR) 20 MG tablet  Dispense: 90 tablet; Refill: 3  - AST  - ALT  - AST  - ALT  - Lipid panel reflex to direct LDL Fasting    Tobacco use disorder  Encourage cessation, not interested in cutting back at this time  - Prof fee: Shared Decision Making for Lung Cancer Screening  - CT Chest Lung Cancer Scrn Low Dose wo    Personal history of tobacco use  - Prof fee: Shared Decision Making for Lung Cancer Screening  - CT Chest Lung Cancer Scrn Low Dose wo            Nicotine/Tobacco Cessation  She reports that she has been smoking cigarettes. She has a 40 pack-year smoking history. She has never been exposed to tobacco smoke. She has never used smokeless tobacco.  Nicotine/Tobacco Cessation Plan  Information offered: Patient not interested at this time        Kodak Hyatt is a 58 year old, presenting for the following health issues:  Depression and Lipids        8/16/2024    11:13 AM   Additional Questions   Roomed by Kirsten BUITRAGO MA   Accompanied by Self     History of Present Illness       Hyperlipidemia:  She presents for follow up of hyperlipidemia.   She is taking medication to lower cholesterol. She is not having myalgia or other side effects to statin medications.    Reason for visit:  Medication refills    She eats 0-1 servings of fruits and vegetables daily.She consumes 1 sweetened beverage(s) daily.She exercises with enough effort to increase her heart rate 20 to 29 minutes per day.  She exercises with enough effort to increase her heart rate 5 days per week.   She is taking medications regularly.     She has been feeling well.  Met with endocrinology and her thyroid level had normalized.  Plans to meet with them later this fall.    Ongoing essential  "tremor-symptoms have been stable.    She has been under additional stress as her dog is sick      Review of Systems  Constitutional, HEENT, cardiovascular, pulmonary, gi and gu systems are negative, except as otherwise noted.      Objective    /72 (BP Location: Right arm, Patient Position: Chair, Cuff Size: Adult Regular)   Pulse 83   Resp 14   Ht 1.715 m (5' 7.5\")   Wt 67 kg (147 lb 9.6 oz)   LMP 01/01/2009   SpO2 95%   BMI 22.78 kg/m    Body mass index is 22.78 kg/m .  Physical Exam  Constitutional:       Appearance: Normal appearance.   HENT:      Right Ear: Tympanic membrane normal.      Left Ear: Tympanic membrane normal.   Eyes:      Conjunctiva/sclera: Conjunctivae normal.   Cardiovascular:      Rate and Rhythm: Normal rate and regular rhythm.   Pulmonary:      Effort: Pulmonary effort is normal.      Breath sounds: Normal breath sounds.   Abdominal:      General: Bowel sounds are normal.   Musculoskeletal:      Right lower leg: No edema.      Left lower leg: No edema.   Skin:     General: Skin is warm and dry.   Neurological:      Mental Status: She is alert.              Lung Cancer Screening Shared Decision Making Visit     Olena King, a 58 year old female, is eligible for lung cancer screening    History   Smoking Status    Every Day    Types: Cigarettes   Smokeless Tobacco    Never       I have discussed with patient the risks and benefits of screening for lung cancer with low-dose CT.     The risks include:    radiation exposure: one low dose chest CT has as much ionizing radiation as about 15 chest x-rays, or 6 months of background radiation living in [place]      false positives: most findings/nodules are NOT cancer, but some might still require additional diagnostic evaluation, including biopsy    over-diagnosis: some slow growing cancers that might never have been clinically significant will be detected and treated unnecessarily     The benefit of early detection of lung cancer is " contingent upon adherence to annual screening or more frequent follow up if indicated.     Furthermore, to benefit from screening, Olena must be willing and able to undergo diagnostic procedures, if indicated. Although no specific guide is available for determining severity of comorbidities, it is reasonable to withhold screening in patients who have greater mortality risk from other diseases.     We did discuss that the best way to prevent lung cancer is to not smoke.    Some patients may value a numeric estimation of lung cancer risk when evaluating if lung cancer screening is right for them, here is one calculator:    ShouldIScreen  Signed Electronically by: AMBER PICKERING DO

## 2024-08-16 NOTE — PATIENT INSTRUCTIONS
Lung Cancer Screening   Frequently Asked Questions  If you are at high-risk for lung cancer, getting screened with low-dose computed tomography (LDCT) every year can help save your life. This handout offers answers to some of the most common questions about lung cancer screening. If you have other questions, please call [phone] ([phone]).     What is it?  Lung cancer screening uses special X-ray technology to create an image of your lung tissue. The exam is quick and easy and takes less than 10 seconds. We don t give you any medicine or use any needles. You can eat before and after the exam. You don t need to change your clothes as long as the clothing on your chest doesn t contain metal. But, you do need to be able to hold your breath for at least 6 seconds during the exam.    What is the goal of lung cancer screening?  The goal of lung cancer screening is to save lives. Many times, lung cancer is not found until a person starts having physical symptoms. Lung cancer screening can help detect lung cancer in the earliest stages when it may be easier to treat.    Who should be screened for lung cancer?  We suggest lung cancer screening for anyone who is at high-risk for lung cancer. You are in the high-risk group if you:      are between the ages of 55 and 79, and    have smoked at least 1 pack of cigarettes a day for 20 or more years, and    still smoke or have quit within the past 15 years.    However, if you have a new cough or shortness of breath, you should talk to your doctor before being screened.    Why does it matter if I have symptoms?  Certain symptoms can be a sign that you have a condition in your lungs that should be checked and treated by your doctor. These symptoms include fever, chest pain, a new or changing cough, shortness of breath that you have never felt before, coughing up blood or unexplained weight loss. Having any of these symptoms can greatly affect the results of lung  cancer screening.       Should all smokers get an LDCT lung cancer screening exam?  It depends. Lung cancer screening is for a very specific group of men and women who have a history of heavy smoking over a long period of time (see  Who should be screened for lung cancer  above).  I am in the high-risk group, but have been diagnosed with cancer in the past. Is LDCT lung cancer screening right for me?  In some cases, you should not have LDCT lung screening, such as when your doctor is already following your cancer with CT scan studies. Your doctor will help you decide if LDCT lung screening is right for you.  Do I need to have a screening exam every year?  Yes. If you are in the high-risk group described earlier, you should get an LDCT lung cancer screening exam every year until you are 79, or are no longer willing or able to undergo screening and possible procedures to diagnose and treat lung cancer.  How effective is LDCT at preventing death from lung cancer?  Studies have shown that LDCT lung cancer screening can lower the risk of death from lung cancer by 20 percent in people who are at high-risk.  What are the risks?  There are some risks and limitations of LDCT lung cancer screening. We want to make sure you understand the risks and benefits, so please let us know if you have any questions. Your doctor may want to talk with you more about these risks.    Radiation exposure: As with any exam that uses radiation, there is a very small increased risk of cancer. The amount of radiation in LDCT is small--about the same amount a person would get from a mammogram. Your doctor orders the exam when he or she feels the potential benefits outweigh the risks.    False negatives: No test is perfect, including LDCT. It is possible that you may have a medical condition, including lung cancer, that is not found during your exam. This is called a false negative result.    False positives and more testing: LDCT very often finds  something in the lung that could be cancer, but in fact is not. This is called a false positive result. False positive tests often cause anxiety. To make sure these findings are not cancer, you may need to have more tests. These tests will be done only if you give us permission. Sometimes patients need a treatment that can have side effects, such as a biopsy. For more information on false positives, see  What can I expect from the results?     Findings not related to lung cancer: Your LDCT exam also takes pictures of areas of your body next to your lungs. In a very small number of cases, the CT scan will show an abnormal finding in one of these areas, such as your kidneys, adrenal glands, liver or thyroid. This finding may not be serious, but you may need more tests. Your doctor can help you decide what other tests you may need, if any.  What can I expect from the results?  About 1 out of 4 LDCT exams will find something that may need more tests. Most of the time, these findings are lung nodules. Lung nodules are very small collections of tissue in the lung. These nodules are very common, and the vast majority--more than 97 percent--are not cancer (benign). Most are normal lymph nodes or small areas of scarring from past infections.  But, if a small lung nodule is found to be cancer, the cancer can be cured more than 90 percent of the time. To know if the nodule is cancer, we may need to get more images before your next yearly screening exam. If the nodule has suspicious features (for example, it is large, has an odd shape or grows over time), we will refer you to a specialist for further testing.  Will my doctor also get the results?  Yes. Your doctor will get a copy of your results.  Is it okay to keep smoking now that there s a cancer screening exam?  No. Tobacco is one of the strongest cancer-causing agents. It causes not only lung cancer, but other cancers and cardiovascular (heart) diseases as well. The damage  caused by smoking builds over time. This means that the longer you smoke, the higher your risk of disease. While it is never too late to quit, the sooner you quit, the better.  Where can I find help to quit smoking?  The best way to prevent lung cancer is to stop smoking. If you have already quit smoking, congratulations and keep it up! For help on quitting smoking, please call [facility] at 4-291-QUITNOW (1-903.960.5053) or the [employer] at 1-156.131.4116 to find local resources near you.  One-on-one health coaching:  If you d prefer to work individually with a health care provider on tobacco cessation, we offer:      Medication Therapy Management:  Our specially trained pharmacists work closely with you and your doctor to help you quit smoking.  Call [phone] or [phone] (toll free).

## 2024-08-16 NOTE — PROGRESS NOTES
Lung Cancer Screening Shared Decision Making Visit     Olena King, a 58 year old female, is eligible for lung cancer screening    History   Smoking Status     Every Day     Types: Cigarettes   Smokeless Tobacco     Never   {TIP  Follow this link to update the tobacco history if needed :721786}    I have discussed with patient the risks and benefits of screening for lung cancer with low-dose CT.     The risks include:    radiation exposure: one low dose chest CT has as much ionizing radiation as about 15 chest x-rays, or 6 months of background radiation living in [place]      false positives: most findings/nodules are NOT cancer, but some might still require additional diagnostic evaluation, including biopsy    over-diagnosis: some slow growing cancers that might never have been clinically significant will be detected and treated unnecessarily     The benefit of early detection of lung cancer is contingent upon adherence to annual screening or more frequent follow up if indicated.     Furthermore, to benefit from screening, Olena must be willing and able to undergo diagnostic procedures, if indicated. Although no specific guide is available for determining severity of comorbidities, it is reasonable to withhold screening in patients who have greater mortality risk from other diseases.     We did discuss that the best way to prevent lung cancer is to not smoke.    Some patients may value a numeric estimation of lung cancer risk when evaluating if lung cancer screening is right for them, here is one calculator:    ShouldIScreen

## 2024-09-24 ENCOUNTER — OFFICE VISIT (OUTPATIENT)
Dept: ENDOCRINOLOGY | Facility: CLINIC | Age: 59
End: 2024-09-24
Payer: COMMERCIAL

## 2024-09-24 VITALS
SYSTOLIC BLOOD PRESSURE: 130 MMHG | HEART RATE: 73 BPM | DIASTOLIC BLOOD PRESSURE: 81 MMHG | WEIGHT: 152.4 LBS | OXYGEN SATURATION: 98 % | BODY MASS INDEX: 23.52 KG/M2

## 2024-09-24 DIAGNOSIS — E05.10 TOXIC ADENOMA: Primary | ICD-10-CM

## 2024-09-24 DIAGNOSIS — E05.90 SUBCLINICAL HYPERTHYROIDISM: ICD-10-CM

## 2024-09-24 LAB
T4 FREE SERPL-MCNC: 0.86 NG/DL (ref 0.9–1.7)
TSH SERPL DL<=0.005 MIU/L-ACNC: 0.62 UIU/ML (ref 0.3–4.2)

## 2024-09-24 PROCEDURE — 84439 ASSAY OF FREE THYROXINE: CPT | Performed by: INTERNAL MEDICINE

## 2024-09-24 PROCEDURE — 84443 ASSAY THYROID STIM HORMONE: CPT | Performed by: INTERNAL MEDICINE

## 2024-09-24 PROCEDURE — 36415 COLL VENOUS BLD VENIPUNCTURE: CPT | Performed by: INTERNAL MEDICINE

## 2024-09-24 PROCEDURE — 99214 OFFICE O/P EST MOD 30 MIN: CPT | Performed by: INTERNAL MEDICINE

## 2024-09-24 NOTE — PROGRESS NOTES
=======================================================  Assessment     Olena King is a 59 year old female with a past medical history significant for anxiety, seen for evaluation of hyperthyroidism in the context of a longstanding history of a thyroid nodule.    Toxic nodule   The diagnosis is based on the thyroid uptake and scan from November 2023 and the mildly lowish TSH values since 2021.  The nodule was first diagnosed in 2010, when it measured 2.9 cm.  It has minimally increased in size over the years, measuring 3.5 cm on the most recent ultrasound from November 2023.  It was benign on 3 prior biopsies.    Clinically, the patient does not endorse signs or symptoms suggestive of hyperthyroidism.  The plan is to recheck the thyroid hormone levels today.  In the event the TSH is persistently lowish, my recommendation would be to consider radioiodine ablation. Of note that prior free T4 levels have intermittently been low normal, most likely a consequence of an assay error, as free total and free T3 have been normal.      Orders Placed This Encounter   Procedures    TSH    T4 free     =======================================================  The patient is seen in follow-up.  She established care in our clinic in March 2024.    History of Present Illness:  Olena King is a 59 year old female with a past medical history significant for anxiety, essential tremor, mixed hyperlipidemia and colon polyps, seen for follow-up of a thyroid nodule.    Olena was first diagnosed with a 2.9 cm right thyroid nodule in 2010.  The nodule was reportedly benign on the biopsy from 2011.  Subsequent thyroid ultrasounds were done in 2016, 2019, 2020 and November 2023.  The nodule was benign again on the biopsies from 2016 and 2020.  The patient maintained a normal TSH until September 2021, when TSH was mildly low at 0.41 (normal range 0.47-5) and free T4 was normal at 0.73.  On subsequent lab work from July 2023 and September  2023, the TSH remained mildly suppressed, associated with a slightly low free T4.  The TSH normalized on lab work from March 2024, at 0.35, when it was associated with a normal free T3 and free T4 (free T4 by ED was at the lower end of normal range).    On the ultrasound images from November 2023, the right thyroid lobe nodule measured 3.5 cm in maximum diameter and it was mixed, with areas of comet tail artifact and no definite calcifications.  It occupied most of the right thyroid lobe.  The thyroid uptake and scan from 2/5/2024 revealed focal uptake corresponding to the right thyroid nodule, with suppression of the remaining thyroid parenchyma.  The 24-hour uptake was 16.6%.    There is no prior history of radiation exposure or family history of thyroid cancer.  Since her last visit here, she thinks the hands tremor got worse.  Her weight is up a couple of pounds.  Otherwise, she denies fatigue, palpitations, heat or cold intolerance, diarrhea or constipation, hair loss or dry skin.     Past Medical History   Past Medical History:   Diagnosis Date    Anxiety     History of colonic polyps     Mixed hyperlipidemia     Thyroid nodule    Asthma  Right ankle fracture in her 30s      Past Surgical History   Past Surgical History:   Procedure Laterality Date    HYSTERECTOMY TOTAL ABDOMINAL  01/01/2007   Total hysterectomy at age 42 - not treated with HRT     Current Medications    Current Outpatient Medications:     albuterol (PROAIR HFA/PROVENTIL HFA/VENTOLIN HFA) 108 (90 Base) MCG/ACT inhaler, Inhale 2 puffs into the lungs every 4 hours as needed for shortness of breath / dyspnea or wheezing, Disp: 8 g, Rfl: 0    Alum Hydroxide-Mag Trisilicate 80-20 MG CHEW, Take 2 tablets by mouth, Disp: , Rfl:     atorvastatin (LIPITOR) 20 MG tablet, Take 1 tablet (20 mg) by mouth daily, Disp: 90 tablet, Rfl: 3    calcium carbonate 500 mg, elemental, 1250 (500 Ca) MG tablet chewable, Chew and Swallow 200-400 mg by mouth every 4  hours as needed for heartburn., Disp: , Rfl:     cetirizine (ZYRTEC) 10 MG tablet, Take by mouth daily as needed, Disp: , Rfl:     ibuprofen (ADVIL/MOTRIN) 200 MG tablet, Take by mouth every 4-6 hours as needed for anti-inflammatory response or mild pain., Disp: , Rfl:     Lactobacillus 0.05-0.05 MG TABS, Take by mouth once daily. One Billion per day, Disp: , Rfl:     MELALEUCA SC, , Disp: , Rfl:     PARoxetine (PAXIL) 20 MG tablet, Take 1 tablet (20 mg) by mouth every morning, Disp: 90 tablet, Rfl: 3    tretinoin (RETIN-A) 0.025 % external cream, , Disp: , Rfl:     Family History   Problem Relation Age of Onset    Hypertension Mother     Diabetes 2  Mother     Heart Failure Mother     Parkinsonism Mother     Coronary Artery Disease Father     Cardiomyopathy Brother         amyloidosis    Congenital heart disease - Fallot  Brother    Father - thyroid disease (?  Substernal goiter). Grandfather - prostate cancer.     Social History  .  She has 3 children.  She smokes for >40 years, 1 PPD.  She drinks alcohol in weekends, occasionally. Denies using illicit drugs. Occupation: Traveling nurse.              Vital Signs     Previous Weights:    Wt Readings from Last 10 Encounters:   09/24/24 69.1 kg (152 lb 6.4 oz)   08/16/24 67 kg (147 lb 9.6 oz)   03/13/24 65.8 kg (145 lb)   02/06/24 66.4 kg (146 lb 6.4 oz)   07/28/23 66.7 kg (147 lb)   09/15/11 62.6 kg (138 lb)        /81 (BP Location: Left arm, Patient Position: Sitting, Cuff Size: Adult Regular)   Pulse 73   Wt 69.1 kg (152 lb 6.4 oz)   LMP 01/01/2009   SpO2 98%   BMI 23.52 kg/m      Physical Exam  General Appearance: she is well developed, well nourished and in no distress     Eyes:  conjutivae and extra-ocular motions are normal.                                    pupils round and reactive to light, no lid lag, no stare    HEENT:   oropharynx clear and moist, no JVD, no bruits     visible and palpable 3 to 4 cm right thyroid nodule, firm and  mobile  Cardiovascular:  regular rhythm, no murmurs, distal pulse palpable, no edema  Respiratory:        chest clear, no rales, no rhonchi   Musculoskeletal:  normal tone and strength  Psychological:          affect and judgment normal  Skin:  warm, no lesions  Neurological:  reflexes normal and symmetric, coarse resting tremor of the outstretched hands    Lab Results  I reviewed prior lab results documented in Epic.  TSH   Date Value Ref Range Status   03/13/2024 0.35 0.30 - 4.20 uIU/mL Final   09/25/2023 0.18 (L) 0.30 - 4.20 uIU/mL Final   07/28/2023 0.17 (L) 0.30 - 4.20 uIU/mL Final     35 minutes spent on the date of the encounter doing chart review, history and exam, documentation and further activities as noted above.

## 2024-09-24 NOTE — NURSING NOTE
Olena King's goals for this visit include:   Chief Complaint   Patient presents with    Endocrine Problem     Follow-up adenoma     She requests these members of her care team be copied on today's visit information: Yes    PCP: Allison Rapp    Referring Provider:  Allison Rapp [place]  [id number] [place] [address]    /81 (BP Location: Left arm, Patient Position: Sitting, Cuff Size: Adult Regular)   Pulse 73   Wt 69.1 kg (152 lb 6.4 oz)   LMP 01/01/2009   SpO2 98%   BMI 23.52 kg/m      Do you need any medication refills at today's visit? No

## 2024-09-24 NOTE — LETTER
9/24/2024      Olena King  [address] [place] [address]      Dear Colleague,    Thank you for referring your patient, Olena King, to the [facility]. Please see a copy of my visit note below.    =======================================================  Assessment     Olena King is a 59 year old female with a past medical history significant for anxiety, seen for evaluation of hyperthyroidism in the context of a longstanding history of a thyroid nodule.    Toxic nodule   The diagnosis is based on the thyroid uptake and scan from November 2023 and the mildly lowish TSH values since 2021.  The nodule was first diagnosed in 2010, when it measured 2.9 cm.  It has minimally increased in size over the years, measuring 3.5 cm on the most recent ultrasound from November 2023.  It was benign on 3 prior biopsies.    Clinically, the patient does not endorse signs or symptoms suggestive of hyperthyroidism.  The plan is to recheck the thyroid hormone levels today.  In the event the TSH is persistently lowish, my recommendation would be to consider radioiodine ablation. Of note that prior free T4 levels have intermittently been low normal, most likely a consequence of an assay error, as free total and free T3 have been normal.      Orders Placed This Encounter   Procedures     TSH     T4 free     =======================================================  The patient is seen in follow-up.  She established care in our clinic in March 2024.    History of Present Illness:  Olena King is a 59 year old female with a past medical history significant for anxiety, essential tremor, mixed hyperlipidemia and colon polyps, seen for follow-up of a thyroid nodule.    Olena was first diagnosed with a 2.9 cm right thyroid nodule in 2010.  The nodule was reportedly benign on the biopsy from 2011.  Subsequent thyroid ultrasounds were done in 2016, 2019, 2020 and November 2023.  The nodule was benign again  on the biopsies from 2016 and 2020.  The patient maintained a normal TSH until September 2021, when TSH was mildly low at 0.41 (normal range 0.47-5) and free T4 was normal at 0.73.  On subsequent lab work from July 2023 and September 2023, the TSH remained mildly suppressed, associated with a slightly low free T4.  The TSH normalized on lab work from March 2024, at 0.35, when it was associated with a normal free T3 and free T4 (free T4 by ED was at the lower end of normal range).    On the ultrasound images from November 2023, the right thyroid lobe nodule measured 3.5 cm in maximum diameter and it was mixed, with areas of comet tail artifact and no definite calcifications.  It occupied most of the right thyroid lobe.  The thyroid uptake and scan from 2/5/2024 revealed focal uptake corresponding to the right thyroid nodule, with suppression of the remaining thyroid parenchyma.  The 24-hour uptake was 16.6%.    There is no prior history of radiation exposure or family history of thyroid cancer.  Since her last visit here, she thinks the hands tremor got worse.  Her weight is up a couple of pounds.  Otherwise, she denies fatigue, palpitations, heat or cold intolerance, diarrhea or constipation, hair loss or dry skin.     Past Medical History   Past Medical History:   Diagnosis Date     Anxiety      History of colonic polyps      Mixed hyperlipidemia      Thyroid nodule    Asthma  Right ankle fracture in her 30s      Past Surgical History   Past Surgical History:   Procedure Laterality Date     HYSTERECTOMY TOTAL ABDOMINAL  01/01/2007   Total hysterectomy at age 42 - not treated with HRT     Current Medications    Current Outpatient Medications:      albuterol (PROAIR HFA/PROVENTIL HFA/VENTOLIN HFA) 108 (90 Base) MCG/ACT inhaler, Inhale 2 puffs into the lungs every 4 hours as needed for shortness of breath / dyspnea or wheezing, Disp: 8 g, Rfl: 0     Alum Hydroxide-Mag Trisilicate 80-20 MG CHEW, Take 2 tablets by  mouth, Disp: , Rfl:      atorvastatin (LIPITOR) 20 MG tablet, Take 1 tablet (20 mg) by mouth daily, Disp: 90 tablet, Rfl: 3     calcium carbonate 500 mg, elemental, 1250 (500 Ca) MG tablet chewable, Chew and Swallow 200-400 mg by mouth every 4 hours as needed for heartburn., Disp: , Rfl:      cetirizine (ZYRTEC) 10 MG tablet, Take by mouth daily as needed, Disp: , Rfl:      ibuprofen (ADVIL/MOTRIN) 200 MG tablet, Take by mouth every 4-6 hours as needed for anti-inflammatory response or mild pain., Disp: , Rfl:      Lactobacillus 0.05-0.05 MG TABS, Take by mouth once daily. One Billion per day, Disp: , Rfl:      MELALEUCA SC, , Disp: , Rfl:      PARoxetine (PAXIL) 20 MG tablet, Take 1 tablet (20 mg) by mouth every morning, Disp: 90 tablet, Rfl: 3     tretinoin (RETIN-A) 0.025 % external cream, , Disp: , Rfl:     Family History   Problem Relation Age of Onset     Hypertension Mother      Diabetes 2  Mother      Heart Failure Mother      Parkinsonism Mother      Coronary Artery Disease Father      Cardiomyopathy Brother         amyloidosis     Congenital heart disease - Fallot  Brother    Father - thyroid disease (?  Substernal goiter). Grandfather - prostate cancer.     Social History  .  She has 3 children.  She smokes for >40 years, 1 PPD.  She drinks alcohol in weekends, occasionally. Denies using illicit drugs. Occupation: Traveling nurse.              Vital Signs     Previous Weights:    Wt Readings from Last 10 Encounters:   09/24/24 69.1 kg (152 lb 6.4 oz)   08/16/24 67 kg (147 lb 9.6 oz)   03/13/24 65.8 kg (145 lb)   02/06/24 66.4 kg (146 lb 6.4 oz)   07/28/23 66.7 kg (147 lb)   09/15/11 62.6 kg (138 lb)        /81 (BP Location: Left arm, Patient Position: Sitting, Cuff Size: Adult Regular)   Pulse 73   Wt 69.1 kg (152 lb 6.4 oz)   LMP 01/01/2009   SpO2 98%   BMI 23.52 kg/m      Physical Exam  General Appearance: she is well developed, well nourished and in no distress     Eyes:  conjutivae  and extra-ocular motions are normal.                                    pupils round and reactive to light, no lid lag, no stare    HEENT:   oropharynx clear and moist, no JVD, no bruits     visible and palpable 3 to 4 cm right thyroid nodule, firm and mobile  Cardiovascular:  regular rhythm, no murmurs, distal pulse palpable, no edema  Respiratory:        chest clear, no rales, no rhonchi   Musculoskeletal:  normal tone and strength  Psychological:          affect and judgment normal  Skin:  warm, no lesions  Neurological:  reflexes normal and symmetric, coarse resting tremor of the outstretched hands    Lab Results  I reviewed prior lab results documented in Epic.  TSH   Date Value Ref Range Status   03/13/2024 0.35 0.30 - 4.20 uIU/mL Final   09/25/2023 0.18 (L) 0.30 - 4.20 uIU/mL Final   07/28/2023 0.17 (L) 0.30 - 4.20 uIU/mL Final     35 minutes spent on the date of the encounter doing chart review, history and exam, documentation and further activities as noted above.                          Again, thank you for allowing me to participate in the care of your patient.        Sincerely,        Marlene Foreman MD

## 2024-10-12 ENCOUNTER — HEALTH MAINTENANCE LETTER (OUTPATIENT)
Age: 59
End: 2024-10-12

## 2025-01-07 ENCOUNTER — MYC MEDICAL ADVICE (OUTPATIENT)
Dept: FAMILY MEDICINE | Facility: CLINIC | Age: 60
End: 2025-01-07
Payer: COMMERCIAL

## 2025-01-08 ENCOUNTER — E-VISIT (OUTPATIENT)
Dept: URGENT CARE | Facility: CLINIC | Age: 60
End: 2025-01-08
Payer: COMMERCIAL

## 2025-01-08 DIAGNOSIS — B37.31 CANDIDAL VULVOVAGINITIS: Primary | ICD-10-CM

## 2025-01-08 RX ORDER — FLUCONAZOLE 150 MG/1
150 TABLET ORAL ONCE
Qty: 1 TABLET | Refills: 0 | Status: SHIPPED | OUTPATIENT
Start: 2025-01-08 | End: 2025-01-08

## 2025-01-08 NOTE — TELEPHONE ENCOUNTER
Please see my chart message.     Advised to schedule an appointment.     Gretta Cnaales RN on 1/8/2025 at 9:16 AM

## 2025-01-08 NOTE — PATIENT INSTRUCTIONS
Thank you for choosing us for your care. I have placed an order for a prescription so that you can start treatment. View your full visit summary for details by clicking on the link below. Your pharmacist will able to address any questions you may have about the medication.     If you re not feeling better within 2-3 days, please schedule an appointment.  You can schedule an appointment right here in [facility], or call [phone]  If the visit is for the same symptoms as your eVisit, we ll refund the cost of your eVisit if seen within seven days.    Vaginal Yeast Infection: Care Instructions  Overview     A vaginal yeast infection is the growth of too many yeast cells in the vagina. This is a common problem. Itching, vaginal discharge and irritation, and other symptoms can bother you. But yeast infections don't often cause other health problems.  Some medicines can increase your risk of getting a yeast infection. These include antibiotics, hormones, and steroids. You may also be more likely to get a yeast infection if you are pregnant, have diabetes, douche, or wear tight clothes.  With treatment, most yeast infections get better in a few days.  Follow-up care is a key part of your treatment and safety. Be sure to make and go to all appointments, and call your doctor if you are having problems. It's also a good idea to know your test results and keep a list of the medicines you take.  How can you care for yourself at home?  Take your medicines exactly as prescribed. Call your doctor if you think you are having a problem with your medicine.  Ask your doctor about over-the-counter (OTC) medicines for yeast infections. If you use an OTC treatment, read and follow all instructions on the label.  Don't use tampons while using a vaginal cream or suppository. The tampons can absorb the medicine. Use pads instead.  Wear loose cotton clothing. Don't wear nylon or other fabric that holds body heat and moisture close to the  "skin.  Try sleeping without underwear.  Don't scratch. Relieve itching with a cold pack or a cool bath.  Don't wash your vulva more than once a day. Use plain water or a mild, unscented soap. Air-dry the vulva.  Change out of wet or damp clothes as soon as possible.  If you are using a vaginal medicine, don't have sex until you have finished your treatment. But if you do have sex, don't depend on a condom or diaphragm for birth control. The oil in some vaginal medicines weakens latex.  Don't douche or use powders, sprays, or perfumes in your vagina or on your vulva. These items can change the normal balance of organisms in your vagina.  When should you call for help?   Call your doctor now or seek immediate medical care if:    You have new or increased pain in your vagina or pelvis.   Watch closely for changes in your health, and be sure to contact your doctor if:    You have unexpected vaginal bleeding.     You have a fever.     You are not getting better after 2 days.     Your symptoms come back after you finish your medicines.   Where can you learn more?  Go to https://www.[employer].net/patiented  Enter F639 in the search box to learn more about \"Vaginal Yeast Infection: Care Instructions.\"  Current as of: April 30, 2024  Content Version: 14.3    2024 [employer]   Care instructions adapted under license by your healthcare professional. If you have questions about a medical condition or this instruction, always ask your healthcare professional. [employer] disclaims any warranty or liability for your use of this information.    "

## 2025-01-08 NOTE — TELEPHONE ENCOUNTER
Please see my chart message.     Advised appointment however patient declines.     Yeast infection.     Gretta Canales RN on 1/8/2025 at 11:38 AM

## 2025-01-27 ENCOUNTER — OFFICE VISIT (OUTPATIENT)
Dept: NEUROLOGY | Facility: CLINIC | Age: 60
End: 2025-01-27
Payer: COMMERCIAL

## 2025-01-27 VITALS — SYSTOLIC BLOOD PRESSURE: 128 MMHG | DIASTOLIC BLOOD PRESSURE: 80 MMHG | OXYGEN SATURATION: 97 % | HEART RATE: 75 BPM

## 2025-01-27 DIAGNOSIS — G25.0 ESSENTIAL TREMOR: Primary | ICD-10-CM

## 2025-01-27 RX ORDER — PROPRANOLOL HCL 20 MG
20 TABLET ORAL DAILY PRN
Qty: 90 TABLET | Refills: 3 | Status: SHIPPED | OUTPATIENT
Start: 2025-01-27 | End: 2025-01-27

## 2025-01-27 RX ORDER — PROPRANOLOL HCL 20 MG
10-20 TABLET ORAL DAILY PRN
Qty: 90 TABLET | Refills: 3 | Status: SHIPPED | OUTPATIENT
Start: 2025-01-27

## 2025-01-27 NOTE — NURSING NOTE
"Olena King is a 59 year old female who presents for:  Chief Complaint   Patient presents with    Follow Up     Follow Up Movement Disorder 1 Year          Initial Vitals:  /80   Pulse 75   LMP 01/01/2009   SpO2 97%  Estimated body mass index is 23.52 kg/m  as calculated from the following:    Height as of 8/16/24: 1.715 m (5' 7.5\").    Weight as of 9/24/24: 69.1 kg (152 lb 6.4 oz).. There is no height or weight on file to calculate BSA. BP completed using cuff size: regular  Data Unavailable    Nursing Comments:     Lorna Kent MA   "

## 2025-01-27 NOTE — PATIENT INSTRUCTIONS
__ We have some medication options that we could use to help with the tremor. The easiest one to start is a medication called propranolol which can be taken regularly or on an as needed basis. We will prescribe this as needed to start with but if it becomes more of an issue we can change the prescription to be taken regularly.  __ This medicine can cause lightheadedness and dizziness so keep an eye on this and let us know if it occurs. It can also cause some breathing problems in people with more significant asthma than you have but please let us know if you get any shortness of breath.  
Detail Level: Detailed
Quality 431: Preventive Care And Screening: Unhealthy Alcohol Use - Screening: Patient not identified as an unhealthy alcohol user when screened for unhealthy alcohol use using a systematic screening method
Quality 130: Documentation Of Current Medications In The Medical Record: Current Medications Documented
Quality 226: Preventive Care And Screening: Tobacco Use: Screening And Cessation Intervention: Patient screened for tobacco use and is an ex/non-smoker

## 2025-01-27 NOTE — PROGRESS NOTES
Department of Neurology  Movement Disorders Division   Follow-up Note    Patient: Olena King   MRN: [id number]   : 1965   Date of Visit: 2025    Ms. King is a 59 year old right-handed female who presents for follow-up of likely essential tremor. She was last seen on 24, at which time therapies were discussed but the patient wished to hold off on OT and pharmacological management. Today, she is unaccompanied    INTERVAL EVENTS:  Since last visit, she reports that the tremor has worsened slightly. Getting in the way of putting on makeup and eating with a spoon. Right hand is slightly better than her left. Is not currently on any medications for the tremor. Has never tired medications in the past. Walking is going well without issue. Memory is maybe slightly impaired and hasn't gotten worse.     Has had increased pressure in her eyes recently and she is taking latanoprost for this.     ROS:  All others negative except as listed above.    Past Medical History:   Diagnosis Date    Anxiety     History of colonic polyps     Mixed hyperlipidemia     Thyroid nodule         Past Surgical History:   Procedure Laterality Date    HYSTERECTOMY TOTAL ABDOMINAL  2007        Current Outpatient Medications   Medication Sig Dispense Refill    albuterol (PROAIR HFA/PROVENTIL HFA/VENTOLIN HFA) 108 (90 Base) MCG/ACT inhaler Inhale 2 puffs into the lungs every 4 hours as needed for shortness of breath / dyspnea or wheezing 8 g 0    Alum Hydroxide-Mag Trisilicate 80-20 MG CHEW Take 2 tablets by mouth      atorvastatin (LIPITOR) 20 MG tablet Take 1 tablet (20 mg) by mouth daily 90 tablet 3    calcium carbonate 500 mg, elemental, 1250 (500 Ca) MG tablet chewable Chew and Swallow 200-400 mg by mouth every 4 hours as needed for heartburn.      cetirizine (ZYRTEC) 10 MG tablet Take by mouth daily as needed      ibuprofen (ADVIL/MOTRIN) 200 MG tablet Take by mouth every 4-6 hours as needed for anti-inflammatory  response or mild pain.      Lactobacillus 0.05-0.05 MG TABS Take by mouth once daily. One Billion per day      MELALEUCA SC       PARoxetine (PAXIL) 20 MG tablet Take 1 tablet (20 mg) by mouth every morning 90 tablet 3    tretinoin (RETIN-A) 0.025 % external cream          Allergies   Allergen Reactions    Codeine Other (See Comments)    No Clinical Screening - See Comments Other (See Comments)     Animal fur        Family History   Problem Relation Age of Onset    Hypertension Mother     Diabetes Mother     Heart Failure Mother     Parkinsonism Mother     Coronary Artery Disease Father     Cardiomyopathy Brother         amyloidosis    Congenital heart disease Brother         Social History     Socioeconomic History    Marital status:      Spouse name: Not on file    Number of children: 3    Years of education: Not on file    Highest education level: Not on file   Occupational History    Not on file   Tobacco Use    Smoking status: Every Day     Current packs/day: 1.00     Average packs/day: 1 pack/day for 40.0 years (40.0 ttl pk-yrs)     Types: Cigarettes     Passive exposure: Never    Smokeless tobacco: Never   Vaping Use    Vaping status: Never Used   Substance and Sexual Activity    Alcohol use: Not on file    Drug use: Not on file    Sexual activity: Not on file   Other Topics Concern    Not on file   Social History Narrative    Works as RN-travel      Social Drivers of Health     Financial Resource Strain: Not on file   Food Insecurity: Not on file   Transportation Needs: Not on file   Physical Activity: Not on file   Stress: Not on file   Social Connections: Not on file   Interpersonal Safety: Not on file   Housing Stability: Not on file        PHYSICAL EXAM:  /80   Pulse 75   LMP 01/01/2009   SpO2 97%        2/6/2024     9:00 AM 1/27/2025    10:00 AM   Tremor Motor Scale   Assessment Time 09:30 10:45   Medication Off Off   DBS - Right Brain None None   DBS - Left Brain None None   Head 1.5  1.5   Face & Jaw 0 0   Voice 1 1   Outstretched - RIGHT 1 1   Outstretched - LEFT 1.5 1.5   Wingbeating - RIGHT 0 1   Wingbeating - LEFT 0 1   Kinetic - RIGHT 1 1   Kinetic - LEFT 1.5 1.5   Lower Limb - RIGHT 0 0   Lower Limb - LEFT 0 0   Lower Limb (Max) 0 0   Spiral - RIGHT 0 1   Spiral - LEFT 1.5 2.5   Handwriting 0.5 0.5   Dot approx - RIGHT 0 1   Dot approx - LEFT 1 2   Trunk (Standing) 0 0   Total Right 2 5   Total Left 5.5 8.5   Axial 2.5 2.5   TOTAL 10.5 16.5     ASSESSMENT:  59 year old woman who presents for follow-up of likely essential tremor. The patient has had some progression of her tremor since the last visit and would like to trial propranolol PRN which can be increased to scheduled if need be. Discussed that she should keep an eye out for lightheadedness or worsening breathing.     PLAN:  - Propranolol 10-20 mg PRN  - RTC in 6 months    Patient seen and discussed with Movement Disorders attending, Dr. Claudine Franco MD  Neuromodulation/Movement Disorders Fellow

## 2025-01-27 NOTE — LETTER
2025      Olena King  [address]   [place] [place] [facility] [address]      Dear Colleague,    Thank you for referring your patient, Olena King, to the [facility]. Please see a copy of my visit note below.    Department of Neurology  Movement Disorders Division   Follow-up Note    Patient: Olena King   MRN: [id number]   : 1965   Date of Visit: 2025    Ms. King is a 59 year old right-handed female who presents for follow-up of likely essential tremor. She was last seen on 24, at which time therapies were discussed but the patient wished to hold off on OT and pharmacological management. Today, she is unaccompanied    INTERVAL EVENTS:  Since last visit, she reports that the tremor has worsened slightly. Getting in the way of putting on makeup and eating with a spoon. Right hand is slightly better than her left. Is not currently on any medications for the tremor. Has never tired medications in the past. Walking is going well without issue. Memory is maybe slightly impaired and hasn't gotten worse.     Has had increased pressure in her eyes recently and she is taking latanoprost for this.     ROS:  All others negative except as listed above.    Past Medical History:   Diagnosis Date     Anxiety      History of colonic polyps      Mixed hyperlipidemia      Thyroid nodule         Past Surgical History:   Procedure Laterality Date     HYSTERECTOMY TOTAL ABDOMINAL  2007        Current Outpatient Medications   Medication Sig Dispense Refill     albuterol (PROAIR HFA/PROVENTIL HFA/VENTOLIN HFA) 108 (90 Base) MCG/ACT inhaler Inhale 2 puffs into the lungs every 4 hours as needed for shortness of breath / dyspnea or wheezing 8 g 0     Alum Hydroxide-Mag Trisilicate 80-20 MG CHEW Take 2 tablets by mouth       atorvastatin (LIPITOR) 20 MG tablet Take 1 tablet (20 mg) by mouth daily 90 tablet 3     calcium carbonate 500 mg, elemental, 1250 (500 Ca) MG tablet chewable  Chew and Swallow 200-400 mg by mouth every 4 hours as needed for heartburn.       cetirizine (ZYRTEC) 10 MG tablet Take by mouth daily as needed       ibuprofen (ADVIL/MOTRIN) 200 MG tablet Take by mouth every 4-6 hours as needed for anti-inflammatory response or mild pain.       Lactobacillus 0.05-0.05 MG TABS Take by mouth once daily. One Billion per day       MELALEUCA SC        PARoxetine (PAXIL) 20 MG tablet Take 1 tablet (20 mg) by mouth every morning 90 tablet 3     tretinoin (RETIN-A) 0.025 % external cream          Allergies   Allergen Reactions     Codeine Other (See Comments)     No Clinical Screening - See Comments Other (See Comments)     Animal fur        Family History   Problem Relation Age of Onset     Hypertension Mother      Diabetes Mother      Heart Failure Mother      Parkinsonism Mother      Coronary Artery Disease Father      Cardiomyopathy Brother         amyloidosis     Congenital heart disease Brother         Social History     Socioeconomic History     Marital status:      Spouse name: Not on file     Number of children: 3     Years of education: Not on file     Highest education level: Not on file   Occupational History     Not on file   Tobacco Use     Smoking status: Every Day     Current packs/day: 1.00     Average packs/day: 1 pack/day for 40.0 years (40.0 ttl pk-yrs)     Types: Cigarettes     Passive exposure: Never     Smokeless tobacco: Never   Vaping Use     Vaping status: Never Used   Substance and Sexual Activity     Alcohol use: Not on file     Drug use: Not on file     Sexual activity: Not on file   Other Topics Concern     Not on file   Social History Narrative    Works as RN-travel      Social Drivers of Health     Financial Resource Strain: Not on file   Food Insecurity: Not on file   Transportation Needs: Not on file   Physical Activity: Not on file   Stress: Not on file   Social Connections: Not on file   Interpersonal Safety: Not on file   Housing Stability: Not  on file        PHYSICAL EXAM:  /80   Pulse 75   LMP 01/01/2009   SpO2 97%        2/6/2024     9:00 AM 1/27/2025    10:00 AM   Tremor Motor Scale   Assessment Time 09:30 10:45   Medication Off Off   DBS - Right Brain None None   DBS - Left Brain None None   Head 1.5 1.5   Face & Jaw 0 0   Voice 1 1   Outstretched - RIGHT 1 1   Outstretched - LEFT 1.5 1.5   Wingbeating - RIGHT 0 1   Wingbeating - LEFT 0 1   Kinetic - RIGHT 1 1   Kinetic - LEFT 1.5 1.5   Lower Limb - RIGHT 0 0   Lower Limb - LEFT 0 0   Lower Limb (Max) 0 0   Spiral - RIGHT 0 1   Spiral - LEFT 1.5 2.5   Handwriting 0.5 0.5   Dot approx - RIGHT 0 1   Dot approx - LEFT 1 2   Trunk (Standing) 0 0   Total Right 2 5   Total Left 5.5 8.5   Axial 2.5 2.5   TOTAL 10.5 16.5     ASSESSMENT:  59 year old woman who presents for follow-up of likely essential tremor. The patient has had some progression of her tremor since the last visit and would like to trial propranolol PRN which can be increased to scheduled if need be. Discussed that she should keep an eye out for lightheadedness or worsening breathing.     PLAN:  - Propranolol 10-20 mg PRN  - RTC in 6 months    Patient seen and discussed with Movement Disorders attending, Dr. Claudine Franco MD  Neuromodulation/Movement Disorders Fellow              Attestation signed by Pio Barbour MD at 2/3/2025 11:48 AM:  Attestation entered as a separate note.      I, Pio Ash MD, personally interviewed, examined and evaluated this patient. I personally reviewed the vital signs, medications and pertinent labs/imaging. I discussed the patient with Dr Franco and agree with the assessment, examination and plan of care documented, with the additions and/or exceptions delineated below:    She has noticed some interval worsening of tremor, which is slightly more pronounced, with more frequent exacerbations.  She feels like she would like to try a medicine just to be taken as  needed.  We discussed the possibility of propranolol to be taken between 10 to 20 mg as needed, and if she finds herself requiring that medicine more often than not, maybe we should consider doing that scheduled and we can change her prescription accordingly.  We talked about potential side effects and what to do if any.    Will plan on regrouping in the next 4 to 6 months.  Sooner if needed.  Encouraged to contact us back in the meantime if any questions or concerns.      The longitudinal plan of care for Olena was addressed during this visit. Due to the added complexity in care, I will continue to support Olena King in the subsequent management of this condition(s) and with the ongoing continuity of care of this condition(s).    Attending attestation: Today a total 30 minutes were spent on this case, in face-to-face, examining, obtaining history, providing education and coordination of care. Additional time was spent in chart review, ancillary data, and documentation as delineated above.      Again, thank you for allowing me to participate in the care of your patient.        Sincerely,        Pio Ash MD    Electronically signed

## 2025-02-03 NOTE — PROGRESS NOTES
I, Pio Ash MD, personally interviewed, examined and evaluated this patient. I personally reviewed the vital signs, medications and pertinent labs/imaging. I discussed the patient with Dr Franco and agree with the assessment, examination and plan of care documented, with the additions and/or exceptions delineated below:    She has noticed some interval worsening of tremor, which is slightly more pronounced, with more frequent exacerbations.  She feels like she would like to try a medicine just to be taken as needed.  We discussed the possibility of propranolol to be taken between 10 to 20 mg as needed, and if she finds herself requiring that medicine more often than not, maybe we should consider doing that scheduled and we can change her prescription accordingly.  We talked about potential side effects and what to do if any.    Will plan on regrouping in the next 4 to 6 months.  Sooner if needed.  Encouraged to contact us back in the meantime if any questions or concerns.      The longitudinal plan of care for Olena was addressed during this visit. Due to the added complexity in care, I will continue to support Olena LUA Aracelyniles in the subsequent management of this condition(s) and with the ongoing continuity of care of this condition(s).    Attending attestation: Today a total 30 minutes were spent on this case, in face-to-face, examining, obtaining history, providing education and coordination of care. Additional time was spent in chart review, ancillary data, and documentation as delineated above.

## 2025-02-05 ENCOUNTER — OFFICE VISIT (OUTPATIENT)
Dept: FAMILY MEDICINE | Facility: CLINIC | Age: 60
End: 2025-02-05
Payer: COMMERCIAL

## 2025-02-05 ENCOUNTER — ANCILLARY PROCEDURE (OUTPATIENT)
Dept: GENERAL RADIOLOGY | Facility: CLINIC | Age: 60
End: 2025-02-05
Attending: FAMILY MEDICINE
Payer: COMMERCIAL

## 2025-02-05 VITALS
HEART RATE: 76 BPM | BODY MASS INDEX: 22.88 KG/M2 | RESPIRATION RATE: 14 BRPM | HEIGHT: 68 IN | WEIGHT: 151 LBS | DIASTOLIC BLOOD PRESSURE: 76 MMHG | SYSTOLIC BLOOD PRESSURE: 110 MMHG | OXYGEN SATURATION: 98 %

## 2025-02-05 DIAGNOSIS — K21.9 CHRONIC GERD: ICD-10-CM

## 2025-02-05 DIAGNOSIS — R19.7 DIARRHEA, UNSPECIFIED TYPE: ICD-10-CM

## 2025-02-05 DIAGNOSIS — Z87.891 PERSONAL HISTORY OF TOBACCO USE: Primary | ICD-10-CM

## 2025-02-05 DIAGNOSIS — Z78.0 ASYMPTOMATIC POSTMENOPAUSAL STATE: ICD-10-CM

## 2025-02-05 DIAGNOSIS — E78.2 MIXED HYPERLIPIDEMIA: ICD-10-CM

## 2025-02-05 DIAGNOSIS — F17.200 TOBACCO USE DISORDER: ICD-10-CM

## 2025-02-05 DIAGNOSIS — I31.39 EFFUSION, PERICARDIUM: ICD-10-CM

## 2025-02-05 DIAGNOSIS — Z12.31 ENCOUNTER FOR SCREENING MAMMOGRAM FOR BREAST CANCER: ICD-10-CM

## 2025-02-05 DIAGNOSIS — Z00.00 ROUTINE GENERAL MEDICAL EXAMINATION AT A HEALTH CARE FACILITY: ICD-10-CM

## 2025-02-05 DIAGNOSIS — R14.0 ABDOMINAL BLOATING: ICD-10-CM

## 2025-02-05 DIAGNOSIS — Z83.49 FAMILY HISTORY OF AMYLOIDOSIS: ICD-10-CM

## 2025-02-05 DIAGNOSIS — E05.90 SUBCLINICAL HYPERTHYROIDISM: ICD-10-CM

## 2025-02-05 LAB
ALBUMIN SERPL BCG-MCNC: 4.3 G/DL (ref 3.5–5.2)
ALP SERPL-CCNC: 79 U/L (ref 40–150)
ALT SERPL W P-5'-P-CCNC: 19 U/L (ref 0–50)
ANION GAP SERPL CALCULATED.3IONS-SCNC: 10 MMOL/L (ref 7–15)
AST SERPL W P-5'-P-CCNC: 27 U/L (ref 0–45)
BILIRUB SERPL-MCNC: 0.8 MG/DL
BUN SERPL-MCNC: 13.9 MG/DL (ref 8–23)
CALCIUM SERPL-MCNC: 9.7 MG/DL (ref 8.8–10.4)
CHLORIDE SERPL-SCNC: 105 MMOL/L (ref 98–107)
CHOLEST SERPL-MCNC: 147 MG/DL
CREAT SERPL-MCNC: 0.68 MG/DL (ref 0.51–0.95)
EGFRCR SERPLBLD CKD-EPI 2021: >90 ML/MIN/1.73M2
ERYTHROCYTE [DISTWIDTH] IN BLOOD BY AUTOMATED COUNT: 13 % (ref 10–15)
GLUCOSE SERPL-MCNC: 97 MG/DL (ref 70–99)
HCO3 SERPL-SCNC: 27 MMOL/L (ref 22–29)
HCT VFR BLD AUTO: 40.4 % (ref 35–47)
HDLC SERPL-MCNC: 61 MG/DL
HGB BLD-MCNC: 13.3 G/DL (ref 11.7–15.7)
LDLC SERPL CALC-MCNC: 72 MG/DL
MCH RBC QN AUTO: 31 PG (ref 26.5–33)
MCHC RBC AUTO-ENTMCNC: 32.9 G/DL (ref 31.5–36.5)
MCV RBC AUTO: 94 FL (ref 78–100)
NONHDLC SERPL-MCNC: 86 MG/DL
PLATELET # BLD AUTO: 302 10E3/UL (ref 150–450)
POTASSIUM SERPL-SCNC: 4.5 MMOL/L (ref 3.4–5.3)
PROT SERPL-MCNC: 6.4 G/DL (ref 6.4–8.3)
RBC # BLD AUTO: 4.29 10E6/UL (ref 3.8–5.2)
SODIUM SERPL-SCNC: 142 MMOL/L (ref 135–145)
TRIGL SERPL-MCNC: 70 MG/DL
TSH SERPL DL<=0.005 MIU/L-ACNC: 0.7 UIU/ML (ref 0.3–4.2)
WBC # BLD AUTO: 7 10E3/UL (ref 4–11)

## 2025-02-05 PROCEDURE — 74019 RADEX ABDOMEN 2 VIEWS: CPT | Mod: TC | Performed by: RADIOLOGY

## 2025-02-05 PROCEDURE — 85027 COMPLETE CBC AUTOMATED: CPT | Performed by: FAMILY MEDICINE

## 2025-02-05 PROCEDURE — G0296 VISIT TO DETERM LDCT ELIG: HCPCS | Performed by: FAMILY MEDICINE

## 2025-02-05 PROCEDURE — 99396 PREV VISIT EST AGE 40-64: CPT | Performed by: FAMILY MEDICINE

## 2025-02-05 PROCEDURE — 82784 ASSAY IGA/IGD/IGG/IGM EACH: CPT | Performed by: FAMILY MEDICINE

## 2025-02-05 PROCEDURE — 84443 ASSAY THYROID STIM HORMONE: CPT | Performed by: FAMILY MEDICINE

## 2025-02-05 PROCEDURE — 80061 LIPID PANEL: CPT | Performed by: FAMILY MEDICINE

## 2025-02-05 PROCEDURE — 99214 OFFICE O/P EST MOD 30 MIN: CPT | Mod: 25 | Performed by: FAMILY MEDICINE

## 2025-02-05 PROCEDURE — 86364 TISS TRNSGLTMNASE EA IG CLAS: CPT | Performed by: FAMILY MEDICINE

## 2025-02-05 PROCEDURE — 36415 COLL VENOUS BLD VENIPUNCTURE: CPT | Performed by: FAMILY MEDICINE

## 2025-02-05 PROCEDURE — 80053 COMPREHEN METABOLIC PANEL: CPT | Performed by: FAMILY MEDICINE

## 2025-02-05 SDOH — HEALTH STABILITY: PHYSICAL HEALTH: ON AVERAGE, HOW MANY MINUTES DO YOU ENGAGE IN EXERCISE AT THIS LEVEL?: 20 MIN

## 2025-02-05 SDOH — HEALTH STABILITY: PHYSICAL HEALTH: ON AVERAGE, HOW MANY DAYS PER WEEK DO YOU ENGAGE IN MODERATE TO STRENUOUS EXERCISE (LIKE A BRISK WALK)?: 4 DAYS

## 2025-02-05 ASSESSMENT — PAIN SCALES - GENERAL: PAINLEVEL_OUTOF10: NO PAIN (0)

## 2025-02-05 ASSESSMENT — SOCIAL DETERMINANTS OF HEALTH (SDOH): HOW OFTEN DO YOU GET TOGETHER WITH FRIENDS OR RELATIVES?: ONCE A WEEK

## 2025-02-05 NOTE — PROGRESS NOTES
Preventive Care Visit  [facility]  AMBER PICKERING DO, Family Medicine  Feb 5, 2025      Assessment & Plan     Routine general medical examination at a health care facility    Personal history of tobacco use  Encouraged smoking cessation  - Prof fee: Shared Decision Making for Lung Cancer Screening  - CT Chest Lung Cancer Scrn Low Dose wo    Mixed hyperlipidemia  - Lipid panel reflex to direct LDL Fasting    Tobacco use disorder  Encouraged smoking cessation    Subclinical hyperthyroidism  - TSH with free T4 reflex  - TSH with free T4 reflex    Effusion, pericardium  Trace pericardial effusions een on musltiple scans, has intermittent chest wall pain, family hx of amyloidosis, recommedn jsut touching base with cardiology  - Adult Cardiology Eval  Referral    Family history of amyloidosis  - Adult Cardiology Eval  Referral    Asymptomatic postmenopausal state  HX of menopausae at age 42 and smoking  - DX Bone Density    Abdominal bloating  New abdominal bloating and diarrhea over last 3 months, initially ? Of constipation as she typically has bowel movement every 2-3 days. Labs as below. KUB  - Tissue transglutaminase yamel IgA and IgG  - IgA  - Tissue transglutaminase yamel IgA and IgG  - IgA  - Adult GI  Referral - Procedure Only    Diarrhea, unspecified type  KUB does not hsow signfiicant stool burden due to persistance of symtposm recommend EGD/colonoscopy  - Comprehensive metabolic panel (BMP + Alb, Alk Phos, ALT, AST, Total. Bili, TP)  - CBC with platelets  - XR Abdomen 2 Views  - Comprehensive metabolic panel (BMP + Alb, Alk Phos, ALT, AST, Total. Bili, TP)  - CBC with platelets  - Adult GI  Referral - Procedure Only    Encounter for screening mammogram for breast cancer  - MA Screen Bilateral w/Steven    Chronic GERD  STOP tums, can try OTC pepcid   - Adult GI  Referral - Procedure Only      Counseling  Appropriate preventive services were  addressed with this patient via screening, questionnaire, or discussion as appropriate for fall prevention, nutrition, physical activity, Tobacco-use cessation, social engagement, weight loss and cognition.  Checklist reviewing preventive services available has been given to the patient.  Reviewed patient's diet, addressing concerns and/or questions.   She is at risk for psychosocial distress and has been provided with information to reduce risk.       Kodak Hyatt is a 59 year old, presenting for the following:  Physical        2/5/2025    10:17 AM   Additional Questions   Roomed by Kirsten BUITRAGO MA   Accompanied by Self        HPI      Talk about propanolol for tremors.  The neurologist just recently prescribed it, but she isn't comfortable with taking it.    Health Care Directive  Patient does not have a Health Care Directive: Discussed advance care planning with patient; information given to patient to review.      2/5/2025   General Health   How would you rate your overall physical health? Good   Feel stress (tense, anxious, or unable to sleep) To some extent   (!) STRESS CONCERN      2/5/2025   Nutrition   Three or more servings of calcium each day? Yes   Diet: Regular (no restrictions)   How many servings of fruit and vegetables per day? (!) 0-1   How many sweetened beverages each day? 0-1         2/5/2025   Exercise   Days per week of moderate/strenous exercise 4 days   Average minutes spent exercising at this level 20 min         2/5/2025   Social Factors   Frequency of gathering with friends or relatives Once a week   Worry food won't last until get money to buy more No   Food not last or not have enough money for food? No   Do you have housing? (Housing is defined as stable permanent housing and does not include staying ouside in a car, in a tent, in an abandoned building, in an overnight shelter, or couch-surfing.) Yes   Are you worried about losing your housing? No   Lack of transportation? No   Unable  to get utilities (heat,electricity)? No         2/5/2025   Fall Risk   Fallen 2 or more times in the past year? No   Trouble with walking or balance? No          2/5/2025   Dental   Dentist two times every year? Yes       Today's PHQ-2 Score:       2/5/2025    10:08 AM   PHQ-2 ( 1999 [employer])   Q1: Little interest or pleasure in doing things 0   Q2: Feeling down, depressed or hopeless 0   PHQ-2 Score 0    Q1: Little interest or pleasure in doing things Not at all   Q2: Feeling down, depressed or hopeless Not at all   PHQ-2 Score 0       Patient-reported           2/5/2025   Substance Use   Alcohol more than 3/day or more than 7/wk No   Do you use any other substances recreationally? No     Social History     Tobacco Use    Smoking status: Every Day     Current packs/day: 1.00     Average packs/day: 1 pack/day for 40.0 years (40.0 ttl pk-yrs)     Types: Cigarettes     Passive exposure: Never    Smokeless tobacco: Never   Vaping Use    Vaping status: Never Used         11/10/2023   LAST FHS-7 RESULTS   1st degree relative breast or ovarian cancer No   Any relative bilateral breast cancer No   Any male have breast cancer No   Any ONE woman have BOTH breast AND ovarian cancer No   Any woman with breast cancer before 50yrs Yes   2 or more relatives with breast AND/OR ovarian cancer No   2 or more relatives with breast AND/OR bowel cancer No     Mammogram Screening - Mammogram every 1-2 years updated in Health Maintenance based on mutual decision making        2/5/2025   STI Screening   New sexual partner(s) since last STI/HIV test? No     History of abnormal Pap smear: Status post hysterectomy with removal of cervix and no history of CIN2 or greater or cervical cancer. Health Maintenance and Surgical History updated.       ASCVD Risk   The 10-year ASCVD risk score (Nita ELLIS, et al., 2019) is: 4.5%    Values used to calculate the score:      Age: 59 years      Sex: Female      Is Non- :  "No      Diabetic: No      Tobacco smoker: Yes      Systolic Blood Pressure: 110 mmHg      Is BP treated: No      HDL Cholesterol: 56 mg/dL      Total Cholesterol: 171 mg/dL    Reviewed and updated as needed this visit by Provider                    Past Medical History:   Diagnosis Date    Anxiety     History of colonic polyps     Mixed hyperlipidemia     Thyroid nodule      Past Surgical History:   Procedure Laterality Date    HYSTERECTOMY TOTAL ABDOMINAL  01/01/2007         Review of Systems  Constitutional, HEENT, cardiovascular, pulmonary, gi and gu systems are negative, except as otherwise noted.     Objective    Exam  /76 (BP Location: Right arm, Patient Position: Chair, Cuff Size: Adult Regular)   Pulse 76   Resp 14   Ht 1.715 m (5' 7.5\")   Wt 68.5 kg (151 lb)   LMP 01/01/2009   SpO2 98%   BMI 23.30 kg/m     Estimated body mass index is 23.3 kg/m  as calculated from the following:    Height as of this encounter: 1.715 m (5' 7.5\").    Weight as of this encounter: 68.5 kg (151 lb).    Physical Exam  Constitutional:       Appearance: Normal appearance.   HENT:      Head: Normocephalic and atraumatic.      Right Ear: Tympanic membrane normal.      Left Ear: Tympanic membrane normal.      Nose: Nose normal.   Eyes:      Conjunctiva/sclera: Conjunctivae normal.   Cardiovascular:      Rate and Rhythm: Normal rate and regular rhythm.      Pulses: Normal pulses.   Pulmonary:      Effort: Pulmonary effort is normal.   Abdominal:      General: Bowel sounds are normal.   Skin:     General: Skin is warm and dry.   Neurological:      General: No focal deficit present.      Mental Status: She is alert.   Psychiatric:         Thought Content: Thought content normal.         Judgment: Judgment normal.           Signed Electronically by: AMBER PICKERING, DO  Lung Cancer Screening Shared Decision Making Visit     Olena King, a 59 year old female, is eligible for lung cancer screening    History   Smoking Status "    Every Day    Types: Cigarettes   Smokeless Tobacco    Never       I have discussed with patient the risks and benefits of screening for lung cancer with low-dose CT.     The risks include:    radiation exposure: one low dose chest CT has as much ionizing radiation as about 15 chest x-rays, or 6 months of background radiation living in [place]      false positives: most findings/nodules are NOT cancer, but some might still require additional diagnostic evaluation, including biopsy    over-diagnosis: some slow growing cancers that might never have been clinically significant will be detected and treated unnecessarily     The benefit of early detection of lung cancer is contingent upon adherence to annual screening or more frequent follow up if indicated.     Furthermore, to benefit from screening, Olena must be willing and able to undergo diagnostic procedures, if indicated. Although no specific guide is available for determining severity of comorbidities, it is reasonable to withhold screening in patients who have greater mortality risk from other diseases.     We did discuss that the best way to prevent lung cancer is to not smoke.    Some patients may value a numeric estimation of lung cancer risk when evaluating if lung cancer screening is right for them, here is one calculator:    ShouldIScreen

## 2025-02-05 NOTE — PATIENT INSTRUCTIONS
Lung Cancer Screening   Frequently Asked Questions  If you are at high-risk for lung cancer, getting screened with low-dose computed tomography (LDCT) every year can help save your life. This handout offers answers to some of the most common questions about lung cancer screening. If you have other questions, please call [phone] ([phone]).     What is it?  Lung cancer screening uses special X-ray technology to create an image of your lung tissue. The exam is quick and easy and takes less than 10 seconds. We don t give you any medicine or use any needles. You can eat before and after the exam. You don t need to change your clothes as long as the clothing on your chest doesn t contain metal. But, you do need to be able to hold your breath for at least 6 seconds during the exam.    What is the goal of lung cancer screening?  The goal of lung cancer screening is to save lives. Many times, lung cancer is not found until a person starts having physical symptoms. Lung cancer screening can help detect lung cancer in the earliest stages when it may be easier to treat.    Who should be screened for lung cancer?  We suggest lung cancer screening for anyone who is at high-risk for lung cancer. You are in the high-risk group if you:      are between the ages of 55 and 79, and    have smoked at least 1 pack of cigarettes a day for 20 or more years, and    still smoke or have quit within the past 15 years.    However, if you have a new cough or shortness of breath, you should talk to your doctor before being screened.    Why does it matter if I have symptoms?  Certain symptoms can be a sign that you have a condition in your lungs that should be checked and treated by your doctor. These symptoms include fever, chest pain, a new or changing cough, shortness of breath that you have never felt before, coughing up blood or unexplained weight loss. Having any of these symptoms can greatly affect the results of lung  cancer screening.       Should all smokers get an LDCT lung cancer screening exam?  It depends. Lung cancer screening is for a very specific group of men and women who have a history of heavy smoking over a long period of time (see  Who should be screened for lung cancer  above).  I am in the high-risk group, but have been diagnosed with cancer in the past. Is LDCT lung cancer screening right for me?  In some cases, you should not have LDCT lung screening, such as when your doctor is already following your cancer with CT scan studies. Your doctor will help you decide if LDCT lung screening is right for you.  Do I need to have a screening exam every year?  Yes. If you are in the high-risk group described earlier, you should get an LDCT lung cancer screening exam every year until you are 79, or are no longer willing or able to undergo screening and possible procedures to diagnose and treat lung cancer.  How effective is LDCT at preventing death from lung cancer?  Studies have shown that LDCT lung cancer screening can lower the risk of death from lung cancer by 20 percent in people who are at high-risk.  What are the risks?  There are some risks and limitations of LDCT lung cancer screening. We want to make sure you understand the risks and benefits, so please let us know if you have any questions. Your doctor may want to talk with you more about these risks.    Radiation exposure: As with any exam that uses radiation, there is a very small increased risk of cancer. The amount of radiation in LDCT is small--about the same amount a person would get from a mammogram. Your doctor orders the exam when he or she feels the potential benefits outweigh the risks.    False negatives: No test is perfect, including LDCT. It is possible that you may have a medical condition, including lung cancer, that is not found during your exam. This is called a false negative result.    False positives and more testing: LDCT very often finds  something in the lung that could be cancer, but in fact is not. This is called a false positive result. False positive tests often cause anxiety. To make sure these findings are not cancer, you may need to have more tests. These tests will be done only if you give us permission. Sometimes patients need a treatment that can have side effects, such as a biopsy. For more information on false positives, see  What can I expect from the results?     Findings not related to lung cancer: Your LDCT exam also takes pictures of areas of your body next to your lungs. In a very small number of cases, the CT scan will show an abnormal finding in one of these areas, such as your kidneys, adrenal glands, liver or thyroid. This finding may not be serious, but you may need more tests. Your doctor can help you decide what other tests you may need, if any.  What can I expect from the results?  About 1 out of 4 LDCT exams will find something that may need more tests. Most of the time, these findings are lung nodules. Lung nodules are very small collections of tissue in the lung. These nodules are very common, and the vast majority--more than 97 percent--are not cancer (benign). Most are normal lymph nodes or small areas of scarring from past infections.  But, if a small lung nodule is found to be cancer, the cancer can be cured more than 90 percent of the time. To know if the nodule is cancer, we may need to get more images before your next yearly screening exam. If the nodule has suspicious features (for example, it is large, has an odd shape or grows over time), we will refer you to a specialist for further testing.  Will my doctor also get the results?  Yes. Your doctor will get a copy of your results.  Is it okay to keep smoking now that there s a cancer screening exam?  No. Tobacco is one of the strongest cancer-causing agents. It causes not only lung cancer, but other cancers and cardiovascular (heart) diseases as well. The damage  caused by smoking builds over time. This means that the longer you smoke, the higher your risk of disease. While it is never too late to quit, the sooner you quit, the better.  Where can I find help to quit smoking?  The best way to prevent lung cancer is to stop smoking. If you have already quit smoking, congratulations and keep it up! For help on quitting smoking, please call [employer] at 7-302-QUITNOW (1-869.568.2574) or the American Cancer Society at 1-622.581.7780 to find local resources near you.  One-on-one health coaching:  If you d prefer to work individually with a health care provider on tobacco cessation, we offer:      Medication Therapy Management:  Our specially trained pharmacists work closely with you and your doctor to help you quit smoking.  Call [phone] or [phone] (toll free).    Patient Education   Preventive Care Advice   This is general advice given by our system to help you stay healthy. However, your care team may have specific advice just for you. Please talk to your care team about your preventive care needs.  Nutrition  Eat 5 or more servings of fruits and vegetables each day.  Try wheat bread, brown rice and whole grain pasta (instead of white bread, rice, and pasta).  Get enough calcium and vitamin D. Check the label on foods and aim for 100% of the RDA (recommended daily allowance).  Lifestyle  Exercise at least 150 minutes each week  (30 minutes a day, 5 days a week).  Do muscle strengthening activities 2 days a week. These help control your weight and prevent disease.  No smoking.  Wear sunscreen to prevent skin cancer.  Have a dental exam and cleaning every 6 months.  Yearly exams  See your health care team every year to talk about:  Any changes in your health.  Any medicines your care team has prescribed.  Preventive care, family planning, and ways to prevent chronic diseases.  Shots (vaccines)   HPV shots (up to age 26), if you've never had them before.  Hepatitis B  shots (up to age 59), if you've never had them before.  COVID-19 shot: Get this shot when it's due.  Flu shot: Get a flu shot every year.  Tetanus shot: Get a tetanus shot every 10 years.  Pneumococcal, hepatitis A, and RSV shots: Ask your care team if you need these based on your risk.  Shingles shot (for age 50 and up)  General health tests  Diabetes screening:  Starting at age 35, Get screened for diabetes at least every 3 years.  If you are younger than age 35, ask your care team if you should be screened for diabetes.  Cholesterol test: At age 39, start having a cholesterol test every 5 years, or more often if advised.  Bone density scan (DEXA): At age 50, ask your care team if you should have this scan for osteoporosis (brittle bones).  Hepatitis C: Get tested at least once in your life.  STIs (sexually transmitted infections)  Before age 24: Ask your care team if you should be screened for STIs.  After age 24: Get screened for STIs if you're at risk. You are at risk for STIs (including HIV) if:  You are sexually active with more than one person.  You don't use condoms every time.  You or a partner was diagnosed with a sexually transmitted infection.  If you are at risk for HIV, ask about PrEP medicine to prevent HIV.  Get tested for HIV at least once in your life, whether you are at risk for HIV or not.  Cancer screening tests  Cervical cancer screening: If you have a cervix, begin getting regular cervical cancer screening tests starting at age 21.  Breast cancer scan (mammogram): If you've ever had breasts, begin having regular mammograms starting at age 40. This is a scan to check for breast cancer.  Colon cancer screening: It is important to start screening for colon cancer at age 45.  Have a colonoscopy test every 10 years (or more often if you're at risk) Or, ask your provider about stool tests like a FIT test every year or Cologuard test every 3 years.  To learn more about your testing options, visit:    .  For help making a decision, visit:   https://bit.ly/on26355.  Prostate cancer screening test: If you have a prostate, ask your care team if a prostate cancer screening test (PSA) at age 55 is right for you.  Lung cancer screening: If you are a current or former smoker ages 50 to 80, ask your care team if ongoing lung cancer screenings are right for you.  For informational purposes only. Not to replace the advice of your health care provider. Copyright   2023 [employer]. All rights reserved. Clinically reviewed by the [facility] Transitions Program. [employer] - innovate | mentor | invest 334709 - REV 01/24.  Learning About Stress  What is stress?     Stress is your body's response to a hard situation. Your body can have a physical, emotional, or mental response. Stress is a fact of life for most people, and it affects everyone differently. What causes stress for you may not be stressful for someone else.  A lot of things can cause stress. You may feel stress when you go on a job interview, take a test, or run a race. This kind of short-term stress is normal and even useful. It can help you if you need to work hard or react quickly. For example, stress can help you finish an important job on time.  Long-term stress is caused by ongoing stressful situations or events. Examples of long-term stress include long-term health problems, ongoing problems at work, or conflicts in your family. Long-term stress can harm your health.  How does stress affect your health?  When you are stressed, your body responds as though you are in danger. It makes hormones that speed up your heart, make you breathe faster, and give you a burst of energy. This is called the fight-or-flight stress response. If the stress is over quickly, your body goes back to normal and no harm is done.  But if stress happens too often or lasts too long, it can have bad effects. Long-term stress can make you more likely to get sick, and it can make symptoms of  some diseases worse. If you tense up when you are stressed, you may develop neck, shoulder, or low back pain. Stress is linked to high blood pressure and heart disease.  Stress also harms your emotional health. It can make you shepherd, tense, or depressed. Your relationships may suffer, and you may not do well at work or school.  What can you do to manage stress?  You can try these things to help manage stress:   Do something active. Exercise or activity can help reduce stress. Walking is a great way to get started. Even everyday activities such as housecleaning or yard work can help.  Try yoga or sierra chi. These techniques combine exercise and meditation. You may need some training at first to learn them.  Do something you enjoy. For example, listen to music or go to a movie. Practice your hobby or do volunteer work.  Meditate. This can help you relax, because you are not worrying about what happened before or what may happen in the future.  Do guided imagery. Imagine yourself in any setting that helps you feel calm. You can use online videos, books, or a teacher to guide you.  Do breathing exercises. For example:  From a standing position, bend forward from the waist with your knees slightly bent. Let your arms dangle close to the floor.  Breathe in slowly and deeply as you return to a standing position. Roll up slowly and lift your head last.  Hold your breath for just a few seconds in the standing position.  Breathe out slowly and bend forward from the waist.  Let your feelings out. Talk, laugh, cry, and express anger when you need to. Talking with supportive friends or family, a counselor, or a jose luis leader about your feelings is a healthy way to relieve stress. Avoid discussing your feelings with people who make you feel worse.  Write. It may help to write about things that are bothering you. This helps you find out how much stress you feel and what is causing it. When you know this, you can find better ways to  "cope.  What can you do to prevent stress?  You might try some of these things to help prevent stress:  Manage your time. This helps you find time to do the things you want and need to do.  Get enough sleep. Your body recovers from the stresses of the day while you are sleeping.  Get support. Your family, friends, and community can make a difference in how you experience stress.  Limit your news feed. Avoid or limit time on social media or news that may make you feel stressed.  Do something active. Exercise or activity can help reduce stress. Walking is a great way to get started.  Where can you learn more?  Go to https://www.[employer].net/patiented  Enter N032 in the search box to learn more about \"Learning About Stress.\"  Current as of: October 24, 2023  Content Version: 14.3    2024 [employer].   Care instructions adapted under license by your healthcare professional. If you have questions about a medical condition or this instruction, always ask your healthcare professional. [employer] disclaims any warranty or liability for your use of this information.       "

## 2025-02-06 ENCOUNTER — PATIENT OUTREACH (OUTPATIENT)
Dept: CARE COORDINATION | Facility: CLINIC | Age: 60
End: 2025-02-06
Payer: COMMERCIAL

## 2025-02-06 ENCOUNTER — TELEPHONE (OUTPATIENT)
Dept: GASTROENTEROLOGY | Facility: CLINIC | Age: 60
End: 2025-02-06
Payer: COMMERCIAL

## 2025-02-06 LAB
IGA SERPL-MCNC: 88 MG/DL (ref 84–499)
TTG IGA SER-ACNC: <0.2 U/ML
TTG IGG SER-ACNC: <0.6 U/ML

## 2025-02-06 NOTE — TELEPHONE ENCOUNTER
Endoscopy Scheduling Screen      What insurance is in the chart?  Other:  [employer]    Ordering/Referring Provider: Allison Rapp   (If ordering provider performs procedure, schedule with ordering provider unless otherwise instructed. )    BMI: There is no height or weight on file to calculate BMI.  23.3    Sedation Ordered  general anesthesia.   BMI<= 45 45 < BMI <= 48 48 < BMI < = 50  BMI > 50   No Restrictions No [facility]  No [facility]  [facility] with exceptions Hospital Only OR Only       Do you have a history of malignant hyperthermia?  NO    (Females) Are you currently pregnant?   NO     Are you currently on dialysis?   NO    Do you need assistance transferring?   NO    BMI: There is no height or weight on file to calculate BMI.     Is patients BMI > 50?  NO    BMI > 40?  NO    Do you have a diagnosis of diabetes?  NO    Do you take an Oral or Injectable medication for weight loss or diabetes (excluding insulin)?  NO    Do you take the medication Naltrexone?  NO    Do you take blood thinners?  NO    Prep   Are you currently have chronic kidney disease?  NO    Do you have a diagnosis of cystic fibrosis (CF)?  NO    On a regular basis do you go 3 -5 days between bowel movements?  Yes (Extended Prep)    Preferred Pharmacy:    [id number] - [place], [place] - [address] [place] [address] AT [facility] & [address] [facility] [address] [facility] [address]  [address] [facility] [address]  Phone: [phone] Fax: [phone]      Final Scheduling Details     Procedure scheduled  Colonoscopy / Upper endoscopy (EGD)    Surgeon:  Christie     Date of procedure:  3-3-25     Location  [place] - Patient preference.    What is your communication preference for Instructions and/or Bowel Prep?   MyChart    Patient Reminders:    You will receive a call from a Nurse to review instructions and health history.  This assessment must be completed prior to your procedure.  Failure to complete the Nurse assessment may result in the procedure being cancelled.        On the day of your procedure, please designate an adult(s) who can drive you home stay with you for the next 24 hours. The medicines used in the exam will make you sleepy. You will not be able to drive.       You cannot take public transportation, ride share services, or non-medical taxi service without a responsible caregiver.  Medical transport services are allowed with the requirement that a responsible caregiver will receive you at your destination.  We require that drivers and caregivers are confirmed prior to your procedure.     Stage 2: Additional Anesthesia Type: 1% lidocaine with epinephrine

## 2025-02-26 RX ORDER — BISACODYL 5 MG/1
TABLET, DELAYED RELEASE ORAL
Qty: 4 TABLET | Refills: 0 | Status: SHIPPED | OUTPATIENT
Start: 2025-02-26

## 2025-02-26 RX ORDER — POLYETHYLENE GLYCOL 3350 17 G/17G
238 POWDER, FOR SOLUTION ORAL SEE ADMIN INSTRUCTIONS
Qty: 238 G | Refills: 0 | Status: SHIPPED | OUTPATIENT
Start: 2025-02-26

## 2025-02-28 ENCOUNTER — ANESTHESIA EVENT (OUTPATIENT)
Dept: GASTROENTEROLOGY | Facility: CLINIC | Age: 60
End: 2025-02-28
Payer: COMMERCIAL

## 2025-02-28 NOTE — ANESTHESIA PREPROCEDURE EVALUATION
Anesthesia Pre-Procedure Evaluation    Patient: Olena King   MRN: [id number] : 1965        Procedure : Procedure(s):  Colonoscopy  Esophagoscopy, gastroscopy, duodenoscopy (EGD), combined          Past Medical History:   Diagnosis Date    Anxiety     History of colonic polyps     Mixed hyperlipidemia     Thyroid nodule       Past Surgical History:   Procedure Laterality Date    HYSTERECTOMY TOTAL ABDOMINAL  2007      Allergies   Allergen Reactions    Codeine Other (See Comments)    No Clinical Screening - See Comments Other (See Comments)     Animal fur      Social History     Tobacco Use    Smoking status: Every Day     Current packs/day: 1.00     Average packs/day: 1 pack/day for 40.0 years (40.0 ttl pk-yrs)     Types: Cigarettes     Passive exposure: Never    Smokeless tobacco: Never   Substance Use Topics    Alcohol use: Not on file      Wt Readings from Last 1 Encounters:   25 68.5 kg (151 lb)        Anesthesia Evaluation            ROS/MED HX  ENT/Pulmonary:       Neurologic:       Cardiovascular:       METS/Exercise Tolerance:     Hematologic:       Musculoskeletal:       GI/Hepatic:       Renal/Genitourinary:       Endo:     (+)          thyroid problem,            Psychiatric/Substance Use:       Infectious Disease:       Malignancy:       Other:            Physical Exam    Airway        Mallampati: II   TM distance: > 3 FB   Neck ROM: full   Mouth opening: > 3 cm    Respiratory Devices and Support  Comment: Not on oxygen currently       Dental  no notable dental history         Cardiovascular   cardiovascular exam normal          Pulmonary   pulmonary exam normal                OUTSIDE LABS:  CBC:   Lab Results   Component Value Date    WBC 7.0 2025    WBC 8.3 2023    HGB 13.3 2025    HGB 13.8 2023    HCT 40.4 2025    HCT 40.9 2023     2025     2023     BMP:   Lab Results   Component Value Date     2025    NA  "141 07/28/2023    POTASSIUM 4.5 02/05/2025    POTASSIUM 4.5 07/28/2023    CHLORIDE 105 02/05/2025    CHLORIDE 105 07/28/2023    CO2 27 02/05/2025    CO2 27 07/28/2023    BUN 13.9 02/05/2025    BUN 16.3 07/28/2023    CR 0.68 02/05/2025    CR 0.66 07/28/2023    GLC 97 02/05/2025    GLC 94 07/28/2023     COAGS: No results found for: \"PTT\", \"INR\", \"FIBR\"  POC: No results found for: \"BGM\", \"HCG\", \"HCGS\"  HEPATIC:   Lab Results   Component Value Date    ALBUMIN 4.3 02/05/2025    PROTTOTAL 6.4 02/05/2025    ALT 19 02/05/2025    AST 27 02/05/2025    ALKPHOS 79 02/05/2025    BILITOTAL 0.8 02/05/2025     OTHER:   Lab Results   Component Value Date    JIM 9.7 02/05/2025    TSH 0.70 02/05/2025    T4 0.86 (L) 09/24/2024    T3 104 03/13/2024    SED 8 07/28/2023       Anesthesia Plan    ASA Status:  2       Anesthesia Type: General.   Induction: Intravenous, Propofol.   Maintenance: TIVA.        Consents    Anesthesia Plan(s) and associated risks, benefits, and realistic alternatives discussed. Questions answered and patient/representative(s) expressed understanding.     - Discussed: Risks, Benefits and Alternatives for BOTH SEDATION and the PROCEDURE were discussed     - Discussed with:  Patient            Postoperative Care    Pain management: IV analgesics, Oral pain medications, Multi-modal analgesia.   PONV prophylaxis: Ondansetron (or other 5HT-3), Dexamethasone or Solumedrol     Comments:    Other Comments: Patient aware of plan, what to expect, and potential risks. Timeout was performed before bringing the patient back to OR, and once again, patient was asked if they had any questions           PATSY Wiley CRNA    I have reviewed the pertinent notes and labs in the chart from the past 30 days and (re)examined the patient.  Any updates or changes from those notes are reflected in this note.    Clinically Significant Risk Factors Present on Admission                                          "

## 2025-03-03 ENCOUNTER — HOSPITAL ENCOUNTER (OUTPATIENT)
Facility: CLINIC | Age: 60
Discharge: HOME | End: 2025-03-03
Attending: SURGERY | Admitting: SURGERY
Payer: COMMERCIAL

## 2025-03-03 ENCOUNTER — PATIENT OUTREACH (OUTPATIENT)
Dept: GASTROENTEROLOGY | Facility: CLINIC | Age: 60
End: 2025-03-03
Payer: COMMERCIAL

## 2025-03-03 ENCOUNTER — ANESTHESIA (OUTPATIENT)
Dept: GASTROENTEROLOGY | Facility: CLINIC | Age: 60
End: 2025-03-03
Payer: COMMERCIAL

## 2025-03-03 VITALS
TEMPERATURE: 98.2 F | SYSTOLIC BLOOD PRESSURE: 110 MMHG | HEART RATE: 71 BPM | DIASTOLIC BLOOD PRESSURE: 73 MMHG | RESPIRATION RATE: 15 BRPM | OXYGEN SATURATION: 96 %

## 2025-03-03 DIAGNOSIS — D12.6 BENIGN NEOPLASM OF COLON: Primary | ICD-10-CM

## 2025-03-03 LAB
COLONOSCOPY: NORMAL
UPPER GI ENDOSCOPY: NORMAL

## 2025-03-03 PROCEDURE — 250N000009 HC RX 250: Performed by: PHYSICIAN ASSISTANT

## 2025-03-03 PROCEDURE — 88305 TISSUE EXAM BY PATHOLOGIST: CPT | Mod: 26 | Performed by: PATHOLOGY

## 2025-03-03 PROCEDURE — 250N000009 HC RX 250

## 2025-03-03 PROCEDURE — 43251 EGD REMOVE LESION SNARE: CPT | Performed by: SURGERY

## 2025-03-03 PROCEDURE — 258N000003 HC RX IP 258 OP 636: Performed by: PHYSICIAN ASSISTANT

## 2025-03-03 PROCEDURE — 43239 EGD BIOPSY SINGLE/MULTIPLE: CPT | Performed by: SURGERY

## 2025-03-03 PROCEDURE — 88305 TISSUE EXAM BY PATHOLOGIST: CPT | Mod: TC | Performed by: SURGERY

## 2025-03-03 PROCEDURE — 45385 COLONOSCOPY W/LESION REMOVAL: CPT | Performed by: SURGERY

## 2025-03-03 PROCEDURE — 370N000017 HC ANESTHESIA TECHNICAL FEE, PER MIN: Performed by: SURGERY

## 2025-03-03 PROCEDURE — 45380 COLONOSCOPY AND BIOPSY: CPT | Performed by: SURGERY

## 2025-03-03 PROCEDURE — 250N000011 HC RX IP 250 OP 636

## 2025-03-03 PROCEDURE — 258N000003 HC RX IP 258 OP 636: Performed by: NURSE ANESTHETIST, CERTIFIED REGISTERED

## 2025-03-03 RX ORDER — PROPOFOL 10 MG/ML
INJECTION, EMULSION INTRAVENOUS PRN
Status: DISCONTINUED | OUTPATIENT
Start: 2025-03-03 | End: 2025-03-03

## 2025-03-03 RX ORDER — LATANOPROST 50 UG/ML
SOLUTION/ DROPS OPHTHALMIC
COMMUNITY
Start: 2025-02-03

## 2025-03-03 RX ORDER — LIDOCAINE 40 MG/G
CREAM TOPICAL
Status: DISCONTINUED | OUTPATIENT
Start: 2025-03-03 | End: 2025-03-03 | Stop reason: HOSPADM

## 2025-03-03 RX ORDER — SODIUM CHLORIDE, SODIUM LACTATE, POTASSIUM CHLORIDE, CALCIUM CHLORIDE 600; 310; 30; 20 MG/100ML; MG/100ML; MG/100ML; MG/100ML
INJECTION, SOLUTION INTRAVENOUS CONTINUOUS
Status: DISCONTINUED | OUTPATIENT
Start: 2025-03-03 | End: 2025-03-03 | Stop reason: HOSPADM

## 2025-03-03 RX ADMIN — SODIUM CHLORIDE, POTASSIUM CHLORIDE, SODIUM LACTATE AND CALCIUM CHLORIDE: 600; 310; 30; 20 INJECTION, SOLUTION INTRAVENOUS at 13:53

## 2025-03-03 RX ADMIN — LIDOCAINE HYDROCHLORIDE 0.1 ML: 10 INJECTION, SOLUTION EPIDURAL; INFILTRATION; INTRACAUDAL; PERINEURAL at 12:52

## 2025-03-03 RX ADMIN — TOPICAL ANESTHETIC 2 SPRAY: 200 SPRAY DENTAL; PERIODONTAL at 13:53

## 2025-03-03 RX ADMIN — PROPOFOL 200 MCG/KG/MIN: 10 INJECTION, EMULSION INTRAVENOUS at 13:57

## 2025-03-03 RX ADMIN — SODIUM CHLORIDE, POTASSIUM CHLORIDE, SODIUM LACTATE AND CALCIUM CHLORIDE 1000 ML: 600; 310; 30; 20 INJECTION, SOLUTION INTRAVENOUS at 12:51

## 2025-03-03 RX ADMIN — PROPOFOL 50 MG: 10 INJECTION, EMULSION INTRAVENOUS at 13:56

## 2025-03-03 ASSESSMENT — ACTIVITIES OF DAILY LIVING (ADL)
ADLS_ACUITY_SCORE: 41

## 2025-03-03 NOTE — ANESTHESIA CARE TRANSFER NOTE
Patient: Olena King    Procedure: Procedure(s):  Colonoscopy  ESOPHAGOGASTRODUODENOSCOPY, WITH BIOPSY       Diagnosis: Abdominal bloating [R14.0]  Diagnosis Additional Information: No value filed.    Anesthesia Type:   No value filed.     Note:    Oropharynx: oropharynx clear of all foreign objects  Level of Consciousness: awake      Independent Airway: airway patency satisfactory and stable  Dentition: dentition unchanged  Vital Signs Stable: post-procedure vital signs reviewed and stable  Report to RN Given: handoff report given  Patient transferred to: Phase II    Handoff Report: Identifed the Patient, Identified the Reponsible Provider, Reviewed the pertinent medical history, Discussed the surgical course, Reviewed Intra-OP anesthesia mangement and issues during anesthesia, Set expectations for post-procedure period and Allowed opportunity for questions and acknowledgement of understanding      Vitals:  Vitals Value Taken Time   /73 03/03/25 1445   Temp 36.9  C (98.4  F) 03/03/25 1430   Pulse 71 03/03/25 1445   Resp 15 03/03/25 1430   SpO2 96 % 03/03/25 1448   Vitals shown include unfiled device data.    Electronically Signed By: PATSY Marie CRNA  March 3, 2025  2:53 PM

## 2025-03-03 NOTE — H&P
[facility]    Pre-Endoscopy History and Physical     Olena King MRN# [id number]   YOB: 1965 Age: 59 year old     Date of Procedure: 3/3/2025  Primary care provider: Allison Rapp  Type of Endoscopy: Colonoscopy with possible biopsy, possible polypectomy and Esophagogastroduodenoscopy with possible biopsy, possible dilation, possible foreign body removal  Reason for Procedure: Bloating, diarrhea, reflux  Type of Anesthesia Anticipated: Conscious Sedation    HPI:    Olena is a 59 year old female who will be undergoing the above procedure.      A history and physical has been performed. The patient's medications and allergies have been reviewed. The risks and benefits of the procedure and the sedation options and risks were discussed with the patient.  All questions were answered and informed consent was obtained.      She denies a personal or family history of anesthesia complications or bleeding disorders.     EGD and colonoscopy being performed to evaluate for potential causes of bloating, diarrhea, and reflux. Denies abdominal pain, nausea, emesis, constipation, hematochezia, melena.    Patient Active Problem List   Diagnosis    Essential tremor        Past Medical History:   Diagnosis Date    Anxiety     History of colonic polyps     Mixed hyperlipidemia     Thyroid nodule         Past Surgical History:   Procedure Laterality Date    HYSTERECTOMY TOTAL ABDOMINAL  01/01/2007       Social History     Tobacco Use    Smoking status: Every Day     Current packs/day: 1.00     Average packs/day: 1 pack/day for 40.0 years (40.0 ttl pk-yrs)     Types: Cigarettes     Passive exposure: Never    Smokeless tobacco: Never   Substance Use Topics    Alcohol use: Not on file       Family History   Problem Relation Age of Onset    Hypertension Mother     Diabetes Mother     Heart Failure Mother     Parkinsonism Mother     Coronary Artery Disease Father     Cardiomyopathy Brother          amyloidosis    Congenital heart disease Brother        Prior to Admission medications    Medication Sig Start Date End Date Taking? Authorizing Provider   albuterol (PROAIR HFA/PROVENTIL HFA/VENTOLIN HFA) 108 (90 Base) MCG/ACT inhaler Inhale 2 puffs into the lungs every 4 hours as needed for shortness of breath / dyspnea or wheezing 5/16/21  Yes Gm Haddad MD   Alum Hydroxide-Mag Trisilicate 80-20 MG CHEW Take 2 tablets by mouth   Yes Reported, Patient   atorvastatin (LIPITOR) 20 MG tablet Take 1 tablet (20 mg) by mouth daily 8/16/24  Yes Allison Rapp, DO   bisacodyl (DULCOLAX) 5 MG EC tablet 2 days prior to procedure, take 2 tablets at 4 pm. 1 day prior to procedure, take 2 tablets at 4 pm. For additional instructions refer to your colonoscopy prep instructions. 2/26/25  Yes Lorenzo Soriano MD   calcium carbonate 500 mg, elemental, 1250 (500 Ca) MG tablet chewable Chew and Swallow 200-400 mg by mouth every 4 hours as needed for heartburn.   Yes Reported, Patient   cetirizine (ZYRTEC) 10 MG tablet Take by mouth daily as needed   Yes Reported, Patient   ibuprofen (ADVIL/MOTRIN) 200 MG tablet Take by mouth every 4-6 hours as needed for anti-inflammatory response or mild pain.   Yes Reported, Patient   Lactobacillus 0.05-0.05 MG TABS Take by mouth once daily. One Billion per day   Yes Reported, Patient   latanoprost (XALATAN) 0.005 % ophthalmic solution  2/3/25  Yes Reported, Patient   MELALEUCA SC    Yes Reported, Patient   PARoxetine (PAXIL) 20 MG tablet Take 1 tablet (20 mg) by mouth every morning 8/16/24  Yes Allison Rapp, DO   polyethylene glycol (GOLYTELY) 236 g suspension 2 days prior at 5pm, mix and drink half of a jug of Golytely. Drink an 8 oz. glass of Golytely every 15 minutes until half of the jug is gone. Place remainder of Golytely in the refrigerator. 1 day prior at 5 pm, drink the 2nd half of a jug of Golytely bowel prep. 6 hours before your check-in time, drink an 8 oz. glass  "of Golytely every 15 minutes until half of the 2nd jug of Golytely is gone. Discard remainder of second jug. 2/26/25  Yes Lorenzo Soriano MD   polyethylene glycol (MIRALAX) 17 GM/Dose powder Take 238 g by mouth See Admin Instructions. -Refer to \"Extended Colonoscopy Prep\" handout. 2/26/25  Yes Lorezno Soriano MD   tretinoin (RETIN-A) 0.025 % external cream  7/22/23  Yes Reported, Patient       Allergies   Allergen Reactions    Codeine Other (See Comments)    No Clinical Screening - See Comments Other (See Comments)     Animal fur        REVIEW OF SYSTEMS:   5 point ROS negative except as noted above in HPI, including Gen., Resp., CV, GI &  system review.    PHYSICAL EXAM:   /76 (Cuff Size: Adult Regular)   Pulse 79   Temp 98.2  F (36.8  C) (Oral)   Resp 15   LMP 01/01/2009   SpO2 95%  Estimated body mass index is 23.3 kg/m  as calculated from the following:    Height as of 2/5/25: 1.715 m (5' 7.5\").    Weight as of 2/5/25: 68.5 kg (151 lb).   Constitutional: Awake, alert, no acute distress.  Eyes: No scleral icterus.  Conjunctiva are without injection.  ENMT: Mucous membranes moist, dentition and gums are intact.   Neck: Soft, supple, trachea midline.    Endocrine: n/a   Lymphatic: There is no cervical, submandibularadenopathy.  Respiratory: normal effortgs   Cardiovascular: S1, S2  Abdomen: Non-distended, non-tender,  No masses,  Musculoskeletal: No spinal or CVA tenderness. Full range of motion in the upper and lower extremities.    Skin: No skin rashes or lesions to inspection.  No petechia.    Neurologic: alerted and oriented 3x  Psychiatric: The patient's affect is not blunted and mood is appropriate.  DIAGNOSTICS:    Not indicated    IMPRESSION   ASA Class 1 - Healthy patient, no medical problems    PLAN:   Plan for Colonoscopy with possible biopsy, possible polypectomy and Esophagogastroduodenoscopy with possible biopsy, possible dilation, possible foreign body removal. We discussed the risks, " benefits and alternatives and the patient wished to proceed.  Patient is cleared for the above procedure.    The above has been forwarded to the consulting provider.    Maria Teresa Rai, DO PGY-2  [facility]

## 2025-03-03 NOTE — LETTER
Olena King  [address]   [facility] [address]    March 6, 2025    Dear Olena,  This letter is written to inform you of the results of your recent colonoscopy.  Your examination showed no abnormalities.  However random biopsies were taken from the entire colon to see if there are any microscopic causes to your diarrhea.  Final pathology is as below.    D: Large intestine, random, biopsies:  -Normal colonic mucosa showing no evidence of collagenous or lymphocytic (microscopic) colitis, active or chronic colitis or other microscopic abnormality.    Given these findings,  I recommend that you undergo a repeat colonoscopy in 5 year(s) for surveillance. We will enter you into a recall system so you receive a reminder closer to the time that you are due for repeat examination.     Please remember that this recommendation is made with the understanding that you are not experiencing persistent changes in bowel function, bleeding per rectum, and/or significant abdominal pain. If you experience these symptoms, please contact your primary care provider for a further evaluation.     If you have any questions or concerns about the results of your colonoscopy or the appropriate follow-up, please contact the general surgery access center at [phone].    Sincerely,        [facility] SorianoDO FACOS [facility]

## 2025-03-03 NOTE — ANESTHESIA POSTPROCEDURE EVALUATION
Patient: Olena King    Procedure: Procedure(s):  Colonoscopy  ESOPHAGOGASTRODUODENOSCOPY, WITH BIOPSY       Anesthesia Type:  No value filed.    Note:  Disposition: Outpatient   Postop Pain Control: Uneventful            Sign Out: Well controlled pain   PONV: No   Neuro/Psych: Uneventful            Sign Out: Acceptable/Baseline neuro status   Airway/Respiratory: Uneventful            Sign Out: Acceptable/Baseline resp. status   CV/Hemodynamics: Uneventful            Sign Out: Acceptable CV status; No obvious hypovolemia; No obvious fluid overload   Other NRE:    DID A NON-ROUTINE EVENT OCCUR?            Last vitals:  Vitals Value Taken Time   /73 03/03/25 1445   Temp 36.9  C (98.4  F) 03/03/25 1430   Pulse 71 03/03/25 1445   Resp 15 03/03/25 1430   SpO2 96 % 03/03/25 1448   Vitals shown include unfiled device data.    Electronically Signed By: PATSY Marie CRNA  March 3, 2025  2:53 PM

## 2025-03-03 NOTE — LETTER
Olena King  [address] [address]   [facility] [address] [address]  March 6, 2025    Dear Olena,   This letter is to inform you of the results of your pathology report on your upper endoscopy (EGD).    Your pathology report was:  A: Stomach, antrum, biopsy:  -Normal antral mucosa with minimal chronic inflammation  -No evidence of acute inflammation, intestinal metaplasia or dysplasia  -No evidence of Helicobacter-pylori like organisms on routine/H&E stain  B: Small intestine, duodenum, biopsies:  -Normal small intestinal mucosa without evidence of untreated sprue, peptic duodenitis or other microscopic alteration  C: Gastroesophageal junction, biopsies:  -Normal squamous mucosa without evidence of inflammation  -Columnar mucosa without evidence of intestinal metaplasia or dysplasia  Showed findings consistent with a mildly inflamed stomach. If you continue to have symptoms please follow up with your primary care doctor and a gastroenterology referral may be warranted.    If you have any questions or concerns please do not hesitate to call my office at [phone].      Sincerely,     [facility] DO Soriano FACOS  [facility]

## 2025-03-03 NOTE — PROGRESS NOTES
[facility] DISCHARGE NOTE    Patient discharged to home at 2:58 PM via ambulation. Accompanied by spouse and staff. Discharge instructions reviewed with patient and spouse, opportunity offered to ask questions. Prescriptions - None ordered for discharge. All belongings sent with patient.    Toña Cervantes RN

## 2025-03-05 LAB
PATH REPORT.COMMENTS IMP SPEC: NORMAL
PATH REPORT.COMMENTS IMP SPEC: NORMAL
PATH REPORT.FINAL DX SPEC: NORMAL
PATH REPORT.GROSS SPEC: NORMAL
PATH REPORT.MICROSCOPIC SPEC OTHER STN: NORMAL
PATH REPORT.RELEVANT HX SPEC: NORMAL
PHOTO IMAGE: NORMAL

## 2025-03-14 ENCOUNTER — MYC MEDICAL ADVICE (OUTPATIENT)
Dept: FAMILY MEDICINE | Facility: CLINIC | Age: 60
End: 2025-03-14
Payer: COMMERCIAL

## 2025-03-25 ENCOUNTER — HOSPITAL ENCOUNTER (OUTPATIENT)
Dept: MAMMOGRAPHY | Facility: CLINIC | Age: 60
Discharge: HOME | End: 2025-03-25
Attending: FAMILY MEDICINE
Payer: COMMERCIAL

## 2025-03-25 ENCOUNTER — HOSPITAL ENCOUNTER (OUTPATIENT)
Dept: CT IMAGING | Facility: CLINIC | Age: 60
Discharge: HOME | End: 2025-03-25
Attending: FAMILY MEDICINE
Payer: COMMERCIAL

## 2025-03-25 DIAGNOSIS — Z87.891 PERSONAL HISTORY OF TOBACCO USE: ICD-10-CM

## 2025-03-25 DIAGNOSIS — Z12.31 ENCOUNTER FOR SCREENING MAMMOGRAM FOR BREAST CANCER: ICD-10-CM

## 2025-03-25 PROCEDURE — 77063 BREAST TOMOSYNTHESIS BI: CPT

## 2025-03-25 PROCEDURE — 71271 CT THORAX LUNG CANCER SCR C-: CPT

## 2025-03-26 NOTE — TELEPHONE ENCOUNTER
Can you please have jermaine set up an appt to go through her results-ok for virtual.    Allison Rapp, DO

## 2025-03-26 NOTE — TELEPHONE ENCOUNTER
Sent [employer] message:    Jennifer Hyatt,     After reviewing your [employer] message, Dr. Rapp would like you to schedule an appointment to discuss your results. A video visit is ok for this if you prefer. I have attached an appointment request for ease of scheduling.     Please let us know if you have any questions or concerns.     Kathleen MI LPN  [facility]  Tasks For Patient     Appointment: Provider Visit

## 2025-05-14 DIAGNOSIS — F32.A DEPRESSION, UNSPECIFIED DEPRESSION TYPE: ICD-10-CM

## 2025-05-14 DIAGNOSIS — E78.2 MIXED HYPERLIPIDEMIA: ICD-10-CM

## 2025-05-15 RX ORDER — PAROXETINE 20 MG/1
20 TABLET, FILM COATED ORAL EVERY MORNING
Qty: 90 TABLET | Refills: 2 | Status: SHIPPED | OUTPATIENT
Start: 2025-05-15

## 2025-05-15 RX ORDER — ATORVASTATIN CALCIUM 20 MG/1
20 TABLET, FILM COATED ORAL DAILY
Qty: 90 TABLET | Refills: 2 | Status: SHIPPED | OUTPATIENT
Start: 2025-05-15

## 2025-05-15 NOTE — TELEPHONE ENCOUNTER
Requested Prescriptions   Pending Prescriptions Disp Refills    PARoxetine (PAXIL) 20 MG tablet [Pharmacy Med Name: PAROXETINE 20MG TABLETS] 90 tablet 3     Sig: TAKE 1 TABLET(20 MG) BY MOUTH EVERY MORNING       SSRIs Protocol Failed - 5/15/2025 12:52 PM        Failed - PHQ-9 score less than 5 in past 6 months     Please review last PHQ-9 score.       8/16/2024    11:01 AM   PHQ-9 SCORE   PHQ-9 Total Score MyChart 0   PHQ-9 Total Score 0             Passed - Medication is active on med list and the sig matches. RN to manually verify dose and sig if red X/fail.     If the protocol passes (green check), you do not need to verify med dose and sig.    A prescription matches if they are the same clinical intention.    For Example: once daily and every morning are the same.    The protocol can not identify upper and lower case letters as matching and will fail.     For Example: Take 1 tablet (50 mg) by mouth daily     TAKE 1 TABLET (50 MG) BY MOUTH DAILY    For all fails (red x), verify dose and sig.    If the refill does match what is on file, the RN can still proceed to approve the refill request.       If they do not match, route to the appropriate provider.             Passed - Recent (12 mo) or future (90 days) visit within the authorizing provider's specialty     The patient must have completed an in-person or virtual visit within the past 12 months or has a future visit scheduled within the next 90 days with the authorizing provider s specialty.  Urgent care and e-visits do not qualify as an office visit for this protocol.          Passed - Medication indicated for associated diagnosis     Medication is associated with one or more of the following diagnoses:              Anxiety             Bipolar  Depression  Obsessive-compulsive disorder             Panic disorder  Postmenopausal flushing             Premenstrual dysphoric disorder             Social phobia   Adjustment disorder with depressed mood   Mood  disorder   Adjustment disorder with anxious mood          Passed - Patient is age 18 or older        Passed - No active pregnancy on record        Passed - No positive pregnancy test in last 12 months          atorvastatin (LIPITOR) 20 MG tablet [Pharmacy Med Name: ATORVASTATIN 20MG TABLETS] 90 tablet 3     Sig: TAKE 1 TABLET(20 MG) BY MOUTH DAILY       Antihyperlipidemic agents Passed - 5/15/2025 12:52 PM        Passed - LDL on file in the past 12 months        Passed - Medication is active on med list and the sig matches. RN to manually verify dose and sig if red X/fail.     If the protocol passes (green check), you do not need to verify med dose and sig.    A prescription matches if they are the same clinical intention.    For Example: once daily and every morning are the same.    The protocol can not identify upper and lower case letters as matching and will fail.     For Example: Take 1 tablet (50 mg) by mouth daily     TAKE 1 TABLET (50 MG) BY MOUTH DAILY    For all fails (red x), verify dose and sig.    If the refill does match what is on file, the RN can still proceed to approve the refill request.       If they do not match, route to the appropriate provider.             Passed - Recent (12 mo) or future (90 days) visit within the authorizing provider's specialty     The patient must have completed an in-person or virtual visit within the past 12 months or has a future visit scheduled within the next 90 days with the authorizing provider s specialty.  Urgent care and e-visits do not qualify as an office visit for this protocol.          Passed - Patient is age 18 years or older        Passed - No active pregnancy on record        Passed - No positive pregnancy test in past 12 mos

## 2025-07-14 ENCOUNTER — OFFICE VISIT (OUTPATIENT)
Dept: NEUROLOGY | Facility: CLINIC | Age: 60
End: 2025-07-14
Payer: COMMERCIAL

## 2025-07-14 VITALS
SYSTOLIC BLOOD PRESSURE: 122 MMHG | OXYGEN SATURATION: 96 % | BODY MASS INDEX: 23.21 KG/M2 | DIASTOLIC BLOOD PRESSURE: 78 MMHG | WEIGHT: 150.4 LBS | HEART RATE: 89 BPM

## 2025-07-14 DIAGNOSIS — G25.0 ESSENTIAL TREMOR: Primary | ICD-10-CM

## 2025-07-14 PROCEDURE — 3074F SYST BP LT 130 MM HG: CPT | Performed by: PSYCHIATRY & NEUROLOGY

## 2025-07-14 PROCEDURE — 99214 OFFICE O/P EST MOD 30 MIN: CPT | Performed by: PSYCHIATRY & NEUROLOGY

## 2025-07-14 PROCEDURE — 3078F DIAST BP <80 MM HG: CPT | Performed by: PSYCHIATRY & NEUROLOGY

## 2025-07-14 PROCEDURE — G2211 COMPLEX E/M VISIT ADD ON: HCPCS | Performed by: PSYCHIATRY & NEUROLOGY

## 2025-07-14 RX ORDER — PROPRANOLOL HCL 20 MG
20 TABLET ORAL 3 TIMES DAILY
Qty: 270 TABLET | Refills: 3 | Status: SHIPPED | OUTPATIENT
Start: 2025-07-14 | End: 2026-07-14

## 2025-07-14 NOTE — NURSING NOTE
"Olena King is a 59 year old female who presents for:  Chief Complaint   Patient presents with    Follow Up     Follow Up Movement Disorder 1 Year        Initial Vitals:  /78   Pulse 89   Wt 68.2 kg (150 lb 6.4 oz)   LMP 01/01/2009   SpO2 96%   BMI 23.21 kg/m   Estimated body mass index is 23.21 kg/m  as calculated from the following:    Height as of 2/5/25: 1.715 m (5' 7.5\").    Weight as of this encounter: 68.2 kg (150 lb 6.4 oz).. Body surface area is 1.8 meters squared. BP completed using cuff size: regular  Data Unavailable    Nursing Comments:     Lorna Kent MA   "

## 2025-07-14 NOTE — LETTER
2025      Olena King  [address] [place] [address]      Dear Colleague,    Thank you for referring your patient, Olena King, to the [facility]. Please see a copy of my visit note below.    Department of Neurology  Movement Disorders Division   Return Patient Visit    Patient: Olena King   MRN: [id number]   : 1965   Date of Visit: 2025  PCP: Allison Rapp DO   Referring provider: Jarrod Ash    CC:  ED return    Interval history:  Ms. King is a 59 year old female with history significant for essential tremor who presents to movement disorder clinic for follow-up. Last visit was January, with a plan to start propranolol.     She decided to not start the medication after last visit.  However, she does notice some interval worsening of her tremors, to the point that she is convincing herself that it is a good idea to try medication to try to alleviate the tremors.  She feels that particular the tremors on the left hand are more constant right now every time she is trying to use her hand.  No issues with rigidity or bradykinesia, nor any posturing.    From a balance standpoint she reports no issues.  No issues from a cognitive standpoint.    Other than that no other concerns to be reviewed today.       ROS:  All others negative except as listed above. Pertinent movement disorders-specific ROS listed above.    Past Medical History:   Diagnosis Date     Anxiety      History of colonic polyps      Mixed hyperlipidemia      Thyroid nodule         Past Surgical History:   Procedure Laterality Date     COLONOSCOPY N/A 3/3/2025    Procedure: Colonoscopy with biopsy;  Surgeon: Lorenzo Soriano MD;  Location: [facility]     COLONOSCOPY N/A 3/3/2025    Procedure: COLONOSCOPY, FLEXIBLE, WITH LESION REMOVAL USING SNARE;  Surgeon: Lorenzo Soriano MD;  Location: [facility]     ESOPHAGOSCOPY, GASTROSCOPY, DUODENOSCOPY (EGD), COMBINED N/A 3/3/2025    Procedure:  "ESOPHAGOGASTRODUODENOSCOPY, WITH BIOPSY;  Surgeon: Lorenzo Soriano MD;  Location: [facility]     HYSTERECTOMY TOTAL ABDOMINAL  01/01/2007          Current Outpatient Medications:      albuterol (PROAIR HFA/PROVENTIL HFA/VENTOLIN HFA) 108 (90 Base) MCG/ACT inhaler, Inhale 2 puffs into the lungs every 4 hours as needed for shortness of breath / dyspnea or wheezing, Disp: 8 g, Rfl: 0     Alum Hydroxide-Mag Trisilicate 80-20 MG CHEW, Take 2 tablets by mouth, Disp: , Rfl:      atorvastatin (LIPITOR) 20 MG tablet, TAKE 1 TABLET(20 MG) BY MOUTH DAILY, Disp: 90 tablet, Rfl: 2     bisacodyl (DULCOLAX) 5 MG EC tablet, 2 days prior to procedure, take 2 tablets at 4 pm. 1 day prior to procedure, take 2 tablets at 4 pm. For additional instructions refer to your colonoscopy prep instructions., Disp: 4 tablet, Rfl: 0     calcium carbonate 500 mg, elemental, 1250 (500 Ca) MG tablet chewable, Chew and Swallow 200-400 mg by mouth every 4 hours as needed for heartburn., Disp: , Rfl:      cetirizine (ZYRTEC) 10 MG tablet, Take by mouth daily as needed, Disp: , Rfl:      ibuprofen (ADVIL/MOTRIN) 200 MG tablet, Take by mouth every 4-6 hours as needed for anti-inflammatory response or mild pain., Disp: , Rfl:      Lactobacillus 0.05-0.05 MG TABS, Take by mouth once daily. One Billion per day, Disp: , Rfl:      latanoprost (XALATAN) 0.005 % ophthalmic solution, , Disp: , Rfl:      MELALEUCA SC, , Disp: , Rfl:      PARoxetine (PAXIL) 20 MG tablet, Take 1 tablet (20 mg) by mouth every morning., Disp: 90 tablet, Rfl: 2     polyethylene glycol (MIRALAX) 17 GM/Dose powder, Take 238 g by mouth See Admin Instructions. -Refer to \"Extended Colonoscopy Prep\" handout., Disp: 238 g, Rfl: 0     propranolol (INDERAL) 20 MG tablet, Take 1 tablet (20 mg) by mouth 3 times daily., Disp: 270 tablet, Rfl: 3     tretinoin (RETIN-A) 0.025 % external cream, , Disp: , Rfl:      Allergies   Allergen Reactions     Codeine Other (See Comments)     No Clinical Screening - " "See Comments Other (See Comments)     Animal fur        Family History   Problem Relation Age of Onset     Hypertension Mother      Diabetes Mother      Heart Failure Mother      Parkinsonism Mother      Coronary Artery Disease Father      Cardiomyopathy Brother         amyloidosis     Congenital heart disease Brother         Social History:  She  reports that she has been smoking cigarettes. She has a 40 pack-year smoking history. She has never been exposed to tobacco smoke. She has never used smokeless tobacco.     PHYSICAL EXAM:  /78   Pulse 89   Wt 68.2 kg (150 lb 6.4 oz)   LMP 2009   SpO2 96%   BMI 23.21 kg/m       NEURO:  Tremor Scale - Portions of TETRAS Performance Subscale used with permission of [employer]  Assessment Time: 1100  Medication: Off  DBS - Right Brain: None  DBS - Left Brain: None  Head: 1.5  Face & Jaw: 0  none  Voice: 1  slight, during \"ahh,\" \"eee,\" not speech  Outstretched - RIGHT: 1  barely visible  Outstretched - LEFT: 1.5  < 1 cm  Wingbeating - RIGHT: 1  barely visible  Wingbeating - LEFT: 1  barely visible  Kinetic - RIGHT: 1  barely visible  Kinetic - LEFT: 1.5  < 1 cm  Lower Limb - RIGHT: 0  none in either  Lower Limb - LEFT: 0  none in either  Lower Limb (Max): 0  Spiral - RIGHT: 1  slight, barely visible  Spiral - LEFT: 2.5  Handwritin.5  Dot approx - RIGHT: 1  barely visible  Dot approx - LEFT: 2  < 3 cm  Trunk (Standing): 0  none  Total Right: 5  Total Left: 8.5  Axial: 2.5  TOTAL: 16.5    DATA REVIEWED:  Reviewed pertinent data from epic.    ASSESSMENT:  67-year-old woman with essential tremor, with some interval progression of symptoms, even though objectively on exam things seem to be about the same.    PLAN:  -Reviewed impression and plan with patient    - Will go ahead and send another prescription for propranolol.  Provided a titration schedule.  Discussed potential side effects.  Encouraged patient to get a blood pressure machine so we can " monitor her blood pressure throughout the different steps of the titration schedule.  We also talked about the fact that, if she feels great along the way she does not have to continue to increase her medication up to the full dosage.  Along the same lines, if she experiences any side effects she can go back to the previous dose that she tolerated well and stay there and contact us.    - Otherwise we will continue to follow her along.  If she fails propranolol we can potentially discuss the possibility of primidone and we will look at potential medication interactions at that point to see if that would be a good fit for her.    - Otherwise next visit reschedule the next 3 to 4 months.  Sooner if needed.  Encouraged to contact her back in the meantime if any questions or concerns.    Patient Instructions   Week 1. 1/2 tablet (10mg) twice a day (breakfast and dinner)  Week 2. Increase to 1 tablet (20mg) twice a day (breakfast and dinner)  Week 3. Keep at 1 tablet and increase to 3 times a day (breakfast, lunch, and dinner)      Pio Chow MD (Leo) (he/him/his)   of Neurology  [facility]  Department of Neurology      Thank you for referring Olena King to our [facility] Movement Disorders Program, we appreciate the opportunity of being your partner in healthcare. Please feel free to reach out to us at any point if any questions or concerns about this visit.    Attending attestation: Today I spent a total of 30 minutes on this case, in face-to-face, examining, obtaining history, providing education and coordination of care. Additional time was spent in chart review, ancillary data, and documentation as delineated above.    The longitudinal plan of care for Olena was addressed during this visit. Due to the added complexity in care, I will continue to support Olena King in the subsequent management of this condition(s) and with the ongoing continuity of care of this  condition(s).    Again, thank you for allowing me to participate in the care of your patient.        Sincerely,        Pio Ash MD    Electronically signed

## 2025-07-14 NOTE — PATIENT INSTRUCTIONS
Week 1. 1/2 tablet (10mg) twice a day (breakfast and dinner)  Week 2. Increase to 1 tablet (20mg) twice a day (breakfast and dinner)  Week 3. Keep at 1 tablet and increase to 3 times a day (breakfast, lunch, and dinner)

## 2025-07-14 NOTE — PROGRESS NOTES
Department of Neurology  Movement Disorders Division   Return Patient Visit    Patient: Olena King   MRN: [id number]   : 1965   Date of Visit: 2025  PCP: Allison Rapp DO   Referring provider: Jarrod Ash    CC:  ED return    Interval history:  Ms. King is a 59 year old female with history significant for essential tremor who presents to movement disorder clinic for follow-up. Last visit was January, with a plan to start propranolol.     She decided to not start the medication after last visit.  However, she does notice some interval worsening of her tremors, to the point that she is convincing herself that it is a good idea to try medication to try to alleviate the tremors.  She feels that particular the tremors on the left hand are more constant right now every time she is trying to use her hand.  No issues with rigidity or bradykinesia, nor any posturing.    From a balance standpoint she reports no issues.  No issues from a cognitive standpoint.    Other than that no other concerns to be reviewed today.       ROS:  All others negative except as listed above. Pertinent movement disorders-specific ROS listed above.    Past Medical History:   Diagnosis Date    Anxiety     History of colonic polyps     Mixed hyperlipidemia     Thyroid nodule         Past Surgical History:   Procedure Laterality Date    COLONOSCOPY N/A 3/3/2025    Procedure: Colonoscopy with biopsy;  Surgeon: Lorenzo Soriano MD;  Location: [facility]    COLONOSCOPY N/A 3/3/2025    Procedure: COLONOSCOPY, FLEXIBLE, WITH LESION REMOVAL USING SNARE;  Surgeon: Lorenzo Soriano MD;  Location: [facility]    ESOPHAGOSCOPY, GASTROSCOPY, DUODENOSCOPY (EGD), COMBINED N/A 3/3/2025    Procedure: ESOPHAGOGASTRODUODENOSCOPY, WITH BIOPSY;  Surgeon: Lorenzo Soriano MD;  Location: [facility]    HYSTERECTOMY TOTAL ABDOMINAL  2007          Current Outpatient Medications:     albuterol (PROAIR HFA/PROVENTIL HFA/VENTOLIN HFA) 108 (90 Base) MCG/ACT  "inhaler, Inhale 2 puffs into the lungs every 4 hours as needed for shortness of breath / dyspnea or wheezing, Disp: 8 g, Rfl: 0    Alum Hydroxide-Mag Trisilicate 80-20 MG CHEW, Take 2 tablets by mouth, Disp: , Rfl:     atorvastatin (LIPITOR) 20 MG tablet, TAKE 1 TABLET(20 MG) BY MOUTH DAILY, Disp: 90 tablet, Rfl: 2    bisacodyl (DULCOLAX) 5 MG EC tablet, 2 days prior to procedure, take 2 tablets at 4 pm. 1 day prior to procedure, take 2 tablets at 4 pm. For additional instructions refer to your colonoscopy prep instructions., Disp: 4 tablet, Rfl: 0    calcium carbonate 500 mg, elemental, 1250 (500 Ca) MG tablet chewable, Chew and Swallow 200-400 mg by mouth every 4 hours as needed for heartburn., Disp: , Rfl:     cetirizine (ZYRTEC) 10 MG tablet, Take by mouth daily as needed, Disp: , Rfl:     ibuprofen (ADVIL/MOTRIN) 200 MG tablet, Take by mouth every 4-6 hours as needed for anti-inflammatory response or mild pain., Disp: , Rfl:     Lactobacillus 0.05-0.05 MG TABS, Take by mouth once daily. One Billion per day, Disp: , Rfl:     latanoprost (XALATAN) 0.005 % ophthalmic solution, , Disp: , Rfl:     MELALEUCA SC, , Disp: , Rfl:     PARoxetine (PAXIL) 20 MG tablet, Take 1 tablet (20 mg) by mouth every morning., Disp: 90 tablet, Rfl: 2    polyethylene glycol (MIRALAX) 17 GM/Dose powder, Take 238 g by mouth See Admin Instructions. -Refer to \"Extended Colonoscopy Prep\" handout., Disp: 238 g, Rfl: 0    propranolol (INDERAL) 20 MG tablet, Take 1 tablet (20 mg) by mouth 3 times daily., Disp: 270 tablet, Rfl: 3    tretinoin (RETIN-A) 0.025 % external cream, , Disp: , Rfl:      Allergies   Allergen Reactions    Codeine Other (See Comments)    No Clinical Screening - See Comments Other (See Comments)     Animal fur        Family History   Problem Relation Age of Onset    Hypertension Mother     Diabetes Mother     Heart Failure Mother     Parkinsonism Mother     Coronary Artery Disease Father     Cardiomyopathy Brother         " "amyloidosis    Congenital heart disease Brother         Social History:  She  reports that she has been smoking cigarettes. She has a 40 pack-year smoking history. She has never been exposed to tobacco smoke. She has never used smokeless tobacco.     PHYSICAL EXAM:  /78   Pulse 89   Wt 68.2 kg (150 lb 6.4 oz)   LMP 2009   SpO2 96%   BMI 23.21 kg/m       NEURO:  Tremor Scale - Portions of TETRAS Performance Subscale used with permission of [employer]  Assessment Time: 1100  Medication: Off  DBS - Right Brain: None  DBS - Left Brain: None  Head: 1.5  Face & Jaw: 0  none  Voice: 1  slight, during \"ahh,\" \"eee,\" not speech  Outstretched - RIGHT: 1  barely visible  Outstretched - LEFT: 1.5  < 1 cm  Wingbeating - RIGHT: 1  barely visible  Wingbeating - LEFT: 1  barely visible  Kinetic - RIGHT: 1  barely visible  Kinetic - LEFT: 1.5  < 1 cm  Lower Limb - RIGHT: 0  none in either  Lower Limb - LEFT: 0  none in either  Lower Limb (Max): 0  Spiral - RIGHT: 1  slight, barely visible  Spiral - LEFT: 2.5  Handwritin.5  Dot approx - RIGHT: 1  barely visible  Dot approx - LEFT: 2  < 3 cm  Trunk (Standing): 0  none  Total Right: 5  Total Left: 8.5  Axial: 2.5  TOTAL: 16.5    DATA REVIEWED:  Reviewed pertinent data from epic.    ASSESSMENT:  67-year-old woman with essential tremor, with some interval progression of symptoms, even though objectively on exam things seem to be about the same.    PLAN:  -Reviewed impression and plan with patient    - Will go ahead and send another prescription for propranolol.  Provided a titration schedule.  Discussed potential side effects.  Encouraged patient to get a blood pressure machine so we can monitor her blood pressure throughout the different steps of the titration schedule.  We also talked about the fact that, if she feels great along the way she does not have to continue to increase her medication up to the full dosage.  Along the same lines, if she " experiences any side effects she can go back to the previous dose that she tolerated well and stay there and contact us.    - Otherwise we will continue to follow her along.  If she fails propranolol we can potentially discuss the possibility of primidone and we will look at potential medication interactions at that point to see if that would be a good fit for her.    - Otherwise next visit reschedule the next 3 to 4 months.  Sooner if needed.  Encouraged to contact her back in the meantime if any questions or concerns.    Patient Instructions   Week 1. 1/2 tablet (10mg) twice a day (breakfast and dinner)  Week 2. Increase to 1 tablet (20mg) twice a day (breakfast and dinner)  Week 3. Keep at 1 tablet and increase to 3 times a day (breakfast, lunch, and dinner)      Pio Chow MD (Leo) (he/him/his)   of Neurology  [facility]  Department of Neurology      Thank you for referring Olena King to our [facility] Movement Disorders Program, we appreciate the opportunity of being your partner in healthcare. Please feel free to reach out to us at any point if any questions or concerns about this visit.    Attending attestation: Today I spent a total of 30 minutes on this case, in face-to-face, examining, obtaining history, providing education and coordination of care. Additional time was spent in chart review, ancillary data, and documentation as delineated above.    The longitudinal plan of care for Olena was addressed during this visit. Due to the added complexity in care, I will continue to support Olena King in the subsequent management of this condition(s) and with the ongoing continuity of care of this condition(s).

## (undated) DEVICE — ENDO FORCEP ENDOJAW BIOPSY 2.8MMX230CM FB-220U

## (undated) RX ORDER — LIDOCAINE HYDROCHLORIDE 10 MG/ML
INJECTION, SOLUTION EPIDURAL; INFILTRATION; INTRACAUDAL; PERINEURAL
Status: DISPENSED
Start: 2025-03-03